# Patient Record
Sex: FEMALE | Race: WHITE | Employment: OTHER | URBAN - METROPOLITAN AREA
[De-identification: names, ages, dates, MRNs, and addresses within clinical notes are randomized per-mention and may not be internally consistent; named-entity substitution may affect disease eponyms.]

---

## 2018-03-20 PROBLEM — E66.811 OBESITY (BMI 30.0-34.9): Status: ACTIVE | Noted: 2018-03-20

## 2018-03-20 PROBLEM — E66.9 OBESITY (BMI 30.0-34.9): Status: ACTIVE | Noted: 2018-03-20

## 2018-03-20 PROBLEM — M17.11 PRIMARY OSTEOARTHRITIS OF RIGHT KNEE: Status: ACTIVE | Noted: 2018-03-20

## 2019-06-12 PROCEDURE — 81001 URINALYSIS AUTO W/SCOPE: CPT | Performed by: FAMILY MEDICINE

## 2020-02-06 ENCOUNTER — HOSPITAL ENCOUNTER (INPATIENT)
Facility: HOSPITAL | Age: 80
LOS: 1 days | Discharge: HOME | DRG: 066 | End: 2020-02-08
Attending: EMERGENCY MEDICINE | Admitting: HOSPITALIST
Payer: MEDICARE

## 2020-02-06 ENCOUNTER — APPOINTMENT (OUTPATIENT)
Dept: MRI IMAGING | Age: 80
DRG: 066 | End: 2020-02-06
Attending: EMERGENCY MEDICINE
Payer: MEDICARE

## 2020-02-06 ENCOUNTER — APPOINTMENT (OUTPATIENT)
Dept: CT IMAGING | Age: 80
DRG: 066 | End: 2020-02-06
Attending: EMERGENCY MEDICINE
Payer: MEDICARE

## 2020-02-06 ENCOUNTER — APPOINTMENT (OUTPATIENT)
Dept: GENERAL RADIOLOGY | Age: 80
DRG: 066 | End: 2020-02-06
Attending: EMERGENCY MEDICINE
Payer: MEDICARE

## 2020-02-06 DIAGNOSIS — I63.9 CEREBROVASCULAR ACCIDENT (CVA), UNSPECIFIED MECHANISM (HCC): Primary | ICD-10-CM

## 2020-02-06 LAB
ALBUMIN SERPL-MCNC: 3.6 G/DL (ref 3.4–5)
ALBUMIN/GLOB SERPL: 0.9 {RATIO} (ref 1–2)
ALP LIVER SERPL-CCNC: 119 U/L (ref 55–142)
ALT SERPL-CCNC: 65 U/L (ref 13–56)
ANION GAP SERPL CALC-SCNC: 9 MMOL/L (ref 0–18)
AST SERPL-CCNC: 91 U/L (ref 15–37)
BASOPHILS # BLD AUTO: 0.02 X10(3) UL (ref 0–0.2)
BASOPHILS NFR BLD AUTO: 0.2 %
BILIRUB SERPL-MCNC: 1 MG/DL (ref 0.1–2)
BUN BLD-MCNC: 14 MG/DL (ref 7–18)
BUN/CREAT SERPL: 21.2 (ref 10–20)
CALCIUM BLD-MCNC: 9.5 MG/DL (ref 8.5–10.1)
CHLORIDE SERPL-SCNC: 107 MMOL/L (ref 98–112)
CLARITY UR REFRACT.AUTO: CLEAR
CO2 SERPL-SCNC: 25 MMOL/L (ref 21–32)
COLOR UR AUTO: YELLOW
CREAT BLD-MCNC: 0.66 MG/DL (ref 0.55–1.02)
DEPRECATED RDW RBC AUTO: 45.1 FL (ref 35.1–46.3)
EOSINOPHIL # BLD AUTO: 0.01 X10(3) UL (ref 0–0.7)
EOSINOPHIL NFR BLD AUTO: 0.1 %
ERYTHROCYTE [DISTWIDTH] IN BLOOD BY AUTOMATED COUNT: 13 % (ref 11–15)
GLOBULIN PLAS-MCNC: 3.9 G/DL (ref 2.8–4.4)
GLUCOSE BLD-MCNC: 131 MG/DL (ref 70–99)
GLUCOSE UR STRIP.AUTO-MCNC: NEGATIVE MG/DL
HCT VFR BLD AUTO: 45.5 % (ref 35–48)
HGB BLD-MCNC: 14.5 G/DL (ref 12–16)
IMM GRANULOCYTES # BLD AUTO: 0.06 X10(3) UL (ref 0–1)
IMM GRANULOCYTES NFR BLD: 0.5 %
KETONES UR STRIP.AUTO-MCNC: 80 MG/DL
LEUKOCYTE ESTERASE UR QL STRIP.AUTO: NEGATIVE
LYMPHOCYTES # BLD AUTO: 0.94 X10(3) UL (ref 1–4)
LYMPHOCYTES NFR BLD AUTO: 7.7 %
M PROTEIN MFR SERPL ELPH: 7.5 G/DL (ref 6.4–8.2)
MCH RBC QN AUTO: 30.1 PG (ref 26–34)
MCHC RBC AUTO-ENTMCNC: 31.9 G/DL (ref 31–37)
MCV RBC AUTO: 94.4 FL (ref 80–100)
MONOCYTES # BLD AUTO: 0.88 X10(3) UL (ref 0.1–1)
MONOCYTES NFR BLD AUTO: 7.2 %
NEUTROPHILS # BLD AUTO: 10.29 X10 (3) UL (ref 1.5–7.7)
NEUTROPHILS # BLD AUTO: 10.29 X10(3) UL (ref 1.5–7.7)
NEUTROPHILS NFR BLD AUTO: 84.3 %
NITRITE UR QL STRIP.AUTO: NEGATIVE
OSMOLALITY SERPL CALC.SUM OF ELEC: 294 MOSM/KG (ref 275–295)
PH UR STRIP.AUTO: 5.5 [PH] (ref 4.5–8)
PLATELET # BLD AUTO: 198 10(3)UL (ref 150–450)
POTASSIUM SERPL-SCNC: 3.7 MMOL/L (ref 3.5–5.1)
RBC # BLD AUTO: 4.82 X10(6)UL (ref 3.8–5.3)
SODIUM SERPL-SCNC: 141 MMOL/L (ref 136–145)
SP GR UR STRIP.AUTO: >=1.03 (ref 1–1.03)
TROPONIN I SERPL-MCNC: <0.045 NG/ML (ref ?–0.04)
UROBILINOGEN UR STRIP.AUTO-MCNC: 0.2 MG/DL
WBC # BLD AUTO: 12.2 X10(3) UL (ref 4–11)

## 2020-02-06 PROCEDURE — 99285 EMERGENCY DEPT VISIT HI MDM: CPT

## 2020-02-06 PROCEDURE — 84484 ASSAY OF TROPONIN QUANT: CPT | Performed by: EMERGENCY MEDICINE

## 2020-02-06 PROCEDURE — 70553 MRI BRAIN STEM W/O & W/DYE: CPT | Performed by: EMERGENCY MEDICINE

## 2020-02-06 PROCEDURE — 93005 ELECTROCARDIOGRAM TRACING: CPT

## 2020-02-06 PROCEDURE — A9575 INJ GADOTERATE MEGLUMI 0.1ML: HCPCS | Performed by: EMERGENCY MEDICINE

## 2020-02-06 PROCEDURE — 85025 COMPLETE CBC W/AUTO DIFF WBC: CPT | Performed by: EMERGENCY MEDICINE

## 2020-02-06 PROCEDURE — 81001 URINALYSIS AUTO W/SCOPE: CPT | Performed by: EMERGENCY MEDICINE

## 2020-02-06 PROCEDURE — 71101 X-RAY EXAM UNILAT RIBS/CHEST: CPT | Performed by: EMERGENCY MEDICINE

## 2020-02-06 PROCEDURE — 36415 COLL VENOUS BLD VENIPUNCTURE: CPT

## 2020-02-06 PROCEDURE — 70450 CT HEAD/BRAIN W/O DYE: CPT | Performed by: EMERGENCY MEDICINE

## 2020-02-06 PROCEDURE — 80053 COMPREHEN METABOLIC PANEL: CPT | Performed by: EMERGENCY MEDICINE

## 2020-02-06 PROCEDURE — 93010 ELECTROCARDIOGRAM REPORT: CPT

## 2020-02-06 RX ORDER — ASPIRIN 325 MG
325 TABLET, DELAYED RELEASE (ENTERIC COATED) ORAL ONCE
Status: COMPLETED | OUTPATIENT
Start: 2020-02-06 | End: 2020-02-06

## 2020-02-07 ENCOUNTER — APPOINTMENT (OUTPATIENT)
Dept: MRI IMAGING | Facility: HOSPITAL | Age: 80
DRG: 066 | End: 2020-02-07
Attending: Other
Payer: MEDICARE

## 2020-02-07 ENCOUNTER — APPOINTMENT (OUTPATIENT)
Dept: CV DIAGNOSTICS | Facility: HOSPITAL | Age: 80
DRG: 066 | End: 2020-02-07
Attending: Other
Payer: MEDICARE

## 2020-02-07 ENCOUNTER — APPOINTMENT (OUTPATIENT)
Dept: CT IMAGING | Facility: HOSPITAL | Age: 80
DRG: 066 | End: 2020-02-07
Attending: Other
Payer: MEDICARE

## 2020-02-07 PROBLEM — I63.9 CEREBROVASCULAR ACCIDENT (CVA), UNSPECIFIED MECHANISM (HCC): Status: ACTIVE | Noted: 2020-02-07

## 2020-02-07 LAB
ANION GAP SERPL CALC-SCNC: 8 MMOL/L (ref 0–18)
ATRIAL RATE: 82 BPM
ATRIAL RATE: 84 BPM
BASOPHILS # BLD AUTO: 0.02 X10(3) UL (ref 0–0.2)
BASOPHILS NFR BLD AUTO: 0.2 %
BUN BLD-MCNC: 12 MG/DL (ref 7–18)
BUN/CREAT SERPL: 19.7 (ref 10–20)
CALCIUM BLD-MCNC: 9.2 MG/DL (ref 8.5–10.1)
CHLORIDE SERPL-SCNC: 107 MMOL/L (ref 98–112)
CHOLEST SMN-MCNC: 145 MG/DL (ref ?–200)
CO2 SERPL-SCNC: 27 MMOL/L (ref 21–32)
CREAT BLD-MCNC: 0.61 MG/DL (ref 0.55–1.02)
DEPRECATED RDW RBC AUTO: 45.3 FL (ref 35.1–46.3)
EOSINOPHIL # BLD AUTO: 0.02 X10(3) UL (ref 0–0.7)
EOSINOPHIL NFR BLD AUTO: 0.2 %
ERYTHROCYTE [DISTWIDTH] IN BLOOD BY AUTOMATED COUNT: 13 % (ref 11–15)
EST. AVERAGE GLUCOSE BLD GHB EST-MCNC: 100 MG/DL (ref 68–126)
GLUCOSE BLD-MCNC: 106 MG/DL (ref 70–99)
GLUCOSE BLD-MCNC: 109 MG/DL (ref 70–99)
GLUCOSE BLD-MCNC: 110 MG/DL (ref 70–99)
GLUCOSE BLD-MCNC: 88 MG/DL (ref 70–99)
GLUCOSE BLD-MCNC: 95 MG/DL (ref 70–99)
GLUCOSE BLD-MCNC: 98 MG/DL (ref 70–99)
HBA1C MFR BLD HPLC: 5.1 % (ref ?–5.7)
HCT VFR BLD AUTO: 43.5 % (ref 35–48)
HDLC SERPL-MCNC: 68 MG/DL (ref 40–59)
HGB BLD-MCNC: 14.1 G/DL (ref 12–16)
IMM GRANULOCYTES # BLD AUTO: 0.04 X10(3) UL (ref 0–1)
IMM GRANULOCYTES NFR BLD: 0.3 %
LDLC SERPL CALC-MCNC: 67 MG/DL (ref ?–100)
LYMPHOCYTES # BLD AUTO: 1.08 X10(3) UL (ref 1–4)
LYMPHOCYTES NFR BLD AUTO: 8.8 %
MCH RBC QN AUTO: 30.9 PG (ref 26–34)
MCHC RBC AUTO-ENTMCNC: 32.4 G/DL (ref 31–37)
MCV RBC AUTO: 95.4 FL (ref 80–100)
MONOCYTES # BLD AUTO: 1.14 X10(3) UL (ref 0.1–1)
MONOCYTES NFR BLD AUTO: 9.3 %
NEUTROPHILS # BLD AUTO: 9.99 X10 (3) UL (ref 1.5–7.7)
NEUTROPHILS # BLD AUTO: 9.99 X10(3) UL (ref 1.5–7.7)
NEUTROPHILS NFR BLD AUTO: 81.2 %
NONHDLC SERPL-MCNC: 77 MG/DL (ref ?–130)
OSMOLALITY SERPL CALC.SUM OF ELEC: 294 MOSM/KG (ref 275–295)
P AXIS: 46 DEGREES
P AXIS: 62 DEGREES
P-R INTERVAL: 144 MS
P-R INTERVAL: 144 MS
PLATELET # BLD AUTO: 187 10(3)UL (ref 150–450)
POTASSIUM SERPL-SCNC: 3.9 MMOL/L (ref 3.5–5.1)
Q-T INTERVAL: 344 MS
Q-T INTERVAL: 368 MS
QRS DURATION: 70 MS
QRS DURATION: 74 MS
QTC CALCULATION (BEZET): 418 MS
QTC CALCULATION (BEZET): 434 MS
R AXIS: 13 DEGREES
R AXIS: 5 DEGREES
RBC # BLD AUTO: 4.56 X10(6)UL (ref 3.8–5.3)
SODIUM SERPL-SCNC: 142 MMOL/L (ref 136–145)
T AXIS: -22 DEGREES
T AXIS: 0 DEGREES
TRIGL SERPL-MCNC: 50 MG/DL (ref 30–149)
VENTRICULAR RATE: 84 BPM
VENTRICULAR RATE: 89 BPM
VLDLC SERPL CALC-MCNC: 10 MG/DL (ref 0–30)
WBC # BLD AUTO: 12.3 X10(3) UL (ref 4–11)

## 2020-02-07 PROCEDURE — 93010 ELECTROCARDIOGRAM REPORT: CPT | Performed by: INTERNAL MEDICINE

## 2020-02-07 PROCEDURE — 82962 GLUCOSE BLOOD TEST: CPT

## 2020-02-07 PROCEDURE — 70549 MR ANGIOGRAPH NECK W/O&W/DYE: CPT | Performed by: OTHER

## 2020-02-07 PROCEDURE — 80061 LIPID PANEL: CPT | Performed by: HOSPITALIST

## 2020-02-07 PROCEDURE — S0028 INJECTION, FAMOTIDINE, 20 MG: HCPCS | Performed by: HOSPITALIST

## 2020-02-07 PROCEDURE — 83036 HEMOGLOBIN GLYCOSYLATED A1C: CPT | Performed by: HOSPITALIST

## 2020-02-07 PROCEDURE — 70450 CT HEAD/BRAIN W/O DYE: CPT | Performed by: OTHER

## 2020-02-07 PROCEDURE — 93306 TTE W/DOPPLER COMPLETE: CPT | Performed by: OTHER

## 2020-02-07 PROCEDURE — 70553 MRI BRAIN STEM W/O & W/DYE: CPT | Performed by: OTHER

## 2020-02-07 PROCEDURE — A9575 INJ GADOTERATE MEGLUMI 0.1ML: HCPCS

## 2020-02-07 PROCEDURE — 85025 COMPLETE CBC W/AUTO DIFF WBC: CPT | Performed by: HOSPITALIST

## 2020-02-07 PROCEDURE — 80048 BASIC METABOLIC PNL TOTAL CA: CPT | Performed by: HOSPITALIST

## 2020-02-07 PROCEDURE — 92610 EVALUATE SWALLOWING FUNCTION: CPT

## 2020-02-07 PROCEDURE — 70546 MR ANGIOGRAPH HEAD W/O&W/DYE: CPT | Performed by: OTHER

## 2020-02-07 PROCEDURE — 93005 ELECTROCARDIOGRAM TRACING: CPT

## 2020-02-07 RX ORDER — ACETAMINOPHEN 325 MG/1
650 TABLET ORAL EVERY 4 HOURS PRN
Status: DISCONTINUED | OUTPATIENT
Start: 2020-02-07 | End: 2020-02-08

## 2020-02-07 RX ORDER — ACETAMINOPHEN 650 MG/1
650 SUPPOSITORY RECTAL EVERY 4 HOURS PRN
Status: DISCONTINUED | OUTPATIENT
Start: 2020-02-07 | End: 2020-02-08

## 2020-02-07 RX ORDER — FAMOTIDINE 10 MG/ML
20 INJECTION, SOLUTION INTRAVENOUS DAILY
Status: DISCONTINUED | OUTPATIENT
Start: 2020-02-07 | End: 2020-02-08

## 2020-02-07 RX ORDER — METOPROLOL SUCCINATE 25 MG/1
25 TABLET, EXTENDED RELEASE ORAL
Status: DISCONTINUED | OUTPATIENT
Start: 2020-02-07 | End: 2020-02-07

## 2020-02-07 RX ORDER — ONDANSETRON 2 MG/ML
4 INJECTION INTRAMUSCULAR; INTRAVENOUS EVERY 6 HOURS PRN
Status: DISCONTINUED | OUTPATIENT
Start: 2020-02-07 | End: 2020-02-07

## 2020-02-07 RX ORDER — ONDANSETRON 2 MG/ML
4 INJECTION INTRAMUSCULAR; INTRAVENOUS EVERY 6 HOURS PRN
Status: DISCONTINUED | OUTPATIENT
Start: 2020-02-07 | End: 2020-02-08

## 2020-02-07 RX ORDER — FAMOTIDINE 20 MG/1
20 TABLET ORAL DAILY
Status: DISCONTINUED | OUTPATIENT
Start: 2020-02-07 | End: 2020-02-08

## 2020-02-07 RX ORDER — SODIUM PHOSPHATE, DIBASIC AND SODIUM PHOSPHATE, MONOBASIC 7; 19 G/133ML; G/133ML
1 ENEMA RECTAL ONCE AS NEEDED
Status: DISCONTINUED | OUTPATIENT
Start: 2020-02-07 | End: 2020-02-08

## 2020-02-07 RX ORDER — ACETAMINOPHEN 160 MG
2000 TABLET,DISINTEGRATING ORAL DAILY
Status: DISCONTINUED | OUTPATIENT
Start: 2020-02-07 | End: 2020-02-08

## 2020-02-07 RX ORDER — METOCLOPRAMIDE HYDROCHLORIDE 5 MG/ML
10 INJECTION INTRAMUSCULAR; INTRAVENOUS EVERY 8 HOURS PRN
Status: DISCONTINUED | OUTPATIENT
Start: 2020-02-07 | End: 2020-02-08

## 2020-02-07 RX ORDER — LEVOTHYROXINE SODIUM 0.15 MG/1
150 TABLET ORAL
Status: DISCONTINUED | OUTPATIENT
Start: 2020-02-07 | End: 2020-02-08

## 2020-02-07 RX ORDER — POLYETHYLENE GLYCOL 3350 17 G/17G
17 POWDER, FOR SOLUTION ORAL DAILY PRN
Status: DISCONTINUED | OUTPATIENT
Start: 2020-02-07 | End: 2020-02-08

## 2020-02-07 RX ORDER — SENNOSIDES 8.6 MG
17.2 TABLET ORAL NIGHTLY
Status: DISCONTINUED | OUTPATIENT
Start: 2020-02-07 | End: 2020-02-08

## 2020-02-07 RX ORDER — ASPIRIN 325 MG
325 TABLET ORAL DAILY
Status: DISCONTINUED | OUTPATIENT
Start: 2020-02-07 | End: 2020-02-08

## 2020-02-07 RX ORDER — SODIUM CHLORIDE 9 MG/ML
INJECTION, SOLUTION INTRAVENOUS CONTINUOUS
Status: DISCONTINUED | OUTPATIENT
Start: 2020-02-07 | End: 2020-02-08

## 2020-02-07 RX ORDER — BISACODYL 10 MG
10 SUPPOSITORY, RECTAL RECTAL
Status: DISCONTINUED | OUTPATIENT
Start: 2020-02-07 | End: 2020-02-08

## 2020-02-07 RX ORDER — ATORVASTATIN CALCIUM 80 MG/1
80 TABLET, FILM COATED ORAL NIGHTLY
Status: DISCONTINUED | OUTPATIENT
Start: 2020-02-07 | End: 2020-02-08

## 2020-02-07 RX ORDER — LABETALOL HYDROCHLORIDE 5 MG/ML
10 INJECTION, SOLUTION INTRAVENOUS EVERY 10 MIN PRN
Status: DISCONTINUED | OUTPATIENT
Start: 2020-02-07 | End: 2020-02-08

## 2020-02-07 RX ORDER — ASPIRIN 300 MG
300 SUPPOSITORY, RECTAL RECTAL DAILY
Status: DISCONTINUED | OUTPATIENT
Start: 2020-02-07 | End: 2020-02-08

## 2020-02-07 RX ORDER — DOCUSATE SODIUM 100 MG/1
100 CAPSULE, LIQUID FILLED ORAL 2 TIMES DAILY
Status: DISCONTINUED | OUTPATIENT
Start: 2020-02-07 | End: 2020-02-08

## 2020-02-07 NOTE — PHYSICAL THERAPY NOTE
Per stroke protocol and stroke committee recommendation, patient is on bedrest for 24 hrs from ADT time of 19:36 on 2/6/2020.   PT will evaluate the patient once activity level is upgraded

## 2020-02-07 NOTE — ED PROVIDER NOTES
Patient Seen in: [address] [facility] In [place]      History   Patient presents with:  Dizziness    Stated Complaint: dizziness fall last noc    HPI    50-year-old female presents for evaluation after a fall yesterday.   Patient states she was 80.3 kg   LMP  (LMP Unknown)   SpO2 96%   BMI 32.38 kg/m²         Physical Exam    General: Alert, oriented, no apparent distress  HEENT: Atraumatic, normocephalic. Pupils equal reactive. Extraocular motions intact. Oropharynx clear.     Neck: Supple  Moises [place] trauma. Radiologist recommend follow-up MRI    EKG shows no acute ischemic changes, labs including troponin are unremarkable.   No arrhythmias seen while observed on telemetry    Ct Brain Or Head (38739)    Result Date: 2/6/2020  PROCEDURE:  CT BRAIN OR HE Brain (w+wo) (cpt=70553)    Result Date: 2/6/2020  PROCEDURE:  MRI BRAIN (W+WO) (CPT=70553)  COMPARISON:  None.   INDICATIONS:  dizziness fall last noc  TECHNIQUE:  MRI of the brain was performed with multi-planar T1, T2-weighted images with FLAIR sequences lobe compressive atelectasis due to large hernia. CARDIAC:  Normal size cardiac silhouette. Retrocardiac large structure contains air and extends beyond the confines of the cardiac silhouette consistent with a large hernia which contains bowel loops.  MEDI

## 2020-02-07 NOTE — CONSULTS
[facility]  Consultation Note      David Brando Patient Status:  Inpatient    6/10/1940 MRN [id number]   [facility] 7NE-A Attending Chidi Agudelo, *   Hosp Day # 0 PCP Riley John MD     Outpatient cardiolog getting up from her recliner (thought to have tripped on her robe?), resultant forehead and R chest trauma/pain. Daughter thought her texts were confusing and she wasn't thinking properly, brought her to ER.   CNS imaging consistent with acute bilateral, m Diagnosis Date   • Essential hypertension    • Hyperlipidemia    • Hypothyroid        Past Surgical History:   Procedure Laterality Date   • APPENDECTOMY     •      • CHOLECYSTECTOMY     • KNEE ARTHROSCOPY Bilateral        Family History  family normoactive; no hepatosplenomegaly  Extremities: No clubbing or cyanosis; moves all 4 extremities normally  Psychiatric: Normal mood and affect; answers questions appropriately  Dermatologic: No rashes; normal skin turgor    Diagnostic testing:    Labs:

## 2020-02-07 NOTE — CONSULTS
Cristina Rome is a 67year old female. Patient presents with:  Dizziness    HPI:    neurologu consult for CVA    Pt fell at home . She was sitting and tried to stand and fell. She hit head and upper body.   No LOC  Pt denies dizziness but per chart , her d no heartburn  GENITAL/: no dysuria, urgency or frequency;  MUSCULOSKELETAL: no joint complaints upper or lower extremities  PSYCHE:no depression or anxiety  NEURO: denies   no incontinence,  Rest of systems reviewed and negative      PHYSICAL EXAM:   BP weighted images without and with infusion.      PATIENT STATED HISTORY:(As transcribed by Technologist)  Patient had a fall last night, dizziness for over a week      CONTRAST USED:  18 mL of Dotarem     FINDINGS:    INTRACRANIAL:  Areas of restricted diffu

## 2020-02-07 NOTE — PLAN OF CARE
Received patient from ED via cart  Family @ bedside  Patient alert oriented x4. Forgetful @ times  On telemetry monitoring. Denies SOB, Denies any pain.   NIH 4, left facial droop, left field cut, slight decrease sensation on left leg noted  Dr. Wing Marie strengthening/mobility  - Encourage toileting schedule  Outcome: Progressing     Problem: CARDIOVASCULAR - ADULT  Goal: Maintains optimal cardiac output and hemodynamic stability  Description  INTERVENTIONS:  - Monitor vital signs, rhythm, and trends  - Mo intact  Description  INTERVENTIONS  - Assess and document risk factors for pressure ulcer development  - Assess and document skin integrity  - Monitor for areas of redness and/or skin breakdown  - Initiate interventions, skin care algorithm/standards of ca Problem: Impaired Functional Mobility  Goal: Achieve highest/safest level of mobility/gait  Description  Interventions:  - Assess patient's functional ability and stability  - Promote increasing activity/tolerance for mobility and gait  - Educate and eng

## 2020-02-07 NOTE — OCCUPATIONAL THERAPY NOTE
Per stroke protocol and stroke committee recommendation, patient is on bedrest for 24 hrs from ADT time of 19:36 on 2/6/2020.   OT will evaluate the patient once activity level is upgraded

## 2020-02-07 NOTE — PLAN OF CARE
Assumed care at 0700. Pt A/O x4. RA. NSR/Afib on tele. VSS. Neuro checks q2 until 1000 and then q4. See flow sheets. Echo completed. Cardiology consulted for Afib. Script for xarelto and eliquis both sent to pharmacy to get a price.  SBP maintained within o assist with strengthening/mobility  - Encourage toileting schedule  Outcome: Progressing     Problem: CARDIOVASCULAR - ADULT  Goal: Maintains optimal cardiac output and hemodynamic stability  Description  INTERVENTIONS:  - Monitor vital signs, rhythm, and intact  Description  INTERVENTIONS  - Assess and document risk factors for pressure ulcer development  - Assess and document skin integrity  - Monitor for areas of redness and/or skin breakdown  - Initiate interventions, skin care algorithm/standards of ca small sips of liquid  - Offer pills one at a time, crush or deliver with applesauce as needed  - Discontinue feeding and notify MD (or speech pathologist) if coughing or persistent throat clearing or wet/gurgly vocal quality is noted   Outcome: Progressing

## 2020-02-07 NOTE — ED INITIAL ASSESSMENT (HPI)
Pt in er for evaluation after falling while trying to stand yesterday, c/o pain to her right ribs, a dizzy feeling, and nausea

## 2020-02-07 NOTE — PLAN OF CARE
Problem: Impaired Swallowing  Goal: Minimize aspiration risk  Description  Interventions:  - Patient should be alert and upright for all feedings (90 degrees preferred)  - Offer food and liquids at a slow rate  - Straws ok  - Encourage small bites of addison

## 2020-02-07 NOTE — SLP NOTE
ADULT SWALLOWING EVALUATION    ASSESSMENT    ASSESSMENT/OVERALL IMPRESSION:  Orders were received for a bedside swallowing evaluation per stroke protocol. Patient admitted following a fall at home. CT and MRI revealed an acute stroke.    Oral motor mechanis eat and drink    SWALLOWING HISTORY  Current Diet Consistency: NPO  Dysphagia History: No past history  Imaging Results:   MRI per radiology notes on 2/6/20:  CONCLUSION:    1.  Bilateral acute infarcts predominantly involve the right cerebral hemisphere an

## 2020-02-07 NOTE — H&P
[facility] Hospitalist H&P       CC: , fall    PCP: Miriam Farris MD    History of Present Illness: Pt is a 68 yo with mmp including but not limited to HTN/HL, hypothyroidism who is admitted for [facility], fall. Says bent over and hand slipped on chair.   Says facial droop   Head:  Normocephalic, without obvious abnormality, atraumatic. Eyes:  Sclera anicteric,  EOMs intact. Lids wnl.  PE   Ears, nose, throat:  external ears and nose within normal limits, hearing intact       Neck: Supple, symmetrical   Lungs: Rich Dao MD on 2/06/2020 at 20:49     Approved by: Rich Dao MD on 2/06/2020 at 20:52             ASSESSMENT / PLAN:    66 yo with mmp including but not limited to HTN/HL, hypothyroidism who is admitted for [address], fall    **LH, fall  **bilateral acute CVA

## 2020-02-08 VITALS
DIASTOLIC BLOOD PRESSURE: 66 MMHG | TEMPERATURE: 98 F | BODY MASS INDEX: 33 KG/M2 | HEART RATE: 97 BPM | SYSTOLIC BLOOD PRESSURE: 125 MMHG | OXYGEN SATURATION: 95 % | WEIGHT: 179.38 LBS | RESPIRATION RATE: 18 BRPM

## 2020-02-08 LAB
ALBUMIN SERPL-MCNC: 2.6 G/DL (ref 3.4–5)
ALBUMIN/GLOB SERPL: 0.8 {RATIO} (ref 1–2)
ALP LIVER SERPL-CCNC: 86 U/L (ref 55–142)
ALT SERPL-CCNC: 42 U/L (ref 13–56)
ANION GAP SERPL CALC-SCNC: 7 MMOL/L (ref 0–18)
AST SERPL-CCNC: 48 U/L (ref 15–37)
BILIRUB SERPL-MCNC: 0.8 MG/DL (ref 0.1–2)
BUN BLD-MCNC: 9 MG/DL (ref 7–18)
BUN/CREAT SERPL: 17.6 (ref 10–20)
CALCIUM BLD-MCNC: 8.1 MG/DL (ref 8.5–10.1)
CHLORIDE SERPL-SCNC: 111 MMOL/L (ref 98–112)
CO2 SERPL-SCNC: 24 MMOL/L (ref 21–32)
CREAT BLD-MCNC: 0.51 MG/DL (ref 0.55–1.02)
DEPRECATED RDW RBC AUTO: 46 FL (ref 35.1–46.3)
ERYTHROCYTE [DISTWIDTH] IN BLOOD BY AUTOMATED COUNT: 13.1 % (ref 11–15)
GLOBULIN PLAS-MCNC: 3.4 G/DL (ref 2.8–4.4)
GLUCOSE BLD-MCNC: 78 MG/DL (ref 70–99)
GLUCOSE BLD-MCNC: 88 MG/DL (ref 70–99)
HAV IGM SER QL: 1.8 MG/DL (ref 1.6–2.6)
HCT VFR BLD AUTO: 41.3 % (ref 35–48)
HGB BLD-MCNC: 13 G/DL (ref 12–16)
M PROTEIN MFR SERPL ELPH: 6 G/DL (ref 6.4–8.2)
MCH RBC QN AUTO: 30.1 PG (ref 26–34)
MCHC RBC AUTO-ENTMCNC: 31.5 G/DL (ref 31–37)
MCV RBC AUTO: 95.6 FL (ref 80–100)
OSMOLALITY SERPL CALC.SUM OF ELEC: 292 MOSM/KG (ref 275–295)
PLATELET # BLD AUTO: 181 10(3)UL (ref 150–450)
POTASSIUM SERPL-SCNC: 3.6 MMOL/L (ref 3.5–5.1)
RBC # BLD AUTO: 4.32 X10(6)UL (ref 3.8–5.3)
SODIUM SERPL-SCNC: 142 MMOL/L (ref 136–145)
TSI SER-ACNC: 3.17 MIU/ML (ref 0.36–3.74)
WBC # BLD AUTO: 8.5 X10(3) UL (ref 4–11)

## 2020-02-08 PROCEDURE — 82962 GLUCOSE BLOOD TEST: CPT

## 2020-02-08 PROCEDURE — 85027 COMPLETE CBC AUTOMATED: CPT | Performed by: HOSPITALIST

## 2020-02-08 PROCEDURE — 97162 PT EVAL MOD COMPLEX 30 MIN: CPT

## 2020-02-08 PROCEDURE — 84443 ASSAY THYROID STIM HORMONE: CPT | Performed by: NURSE PRACTITIONER

## 2020-02-08 PROCEDURE — 83735 ASSAY OF MAGNESIUM: CPT | Performed by: HOSPITALIST

## 2020-02-08 PROCEDURE — 80053 COMPREHEN METABOLIC PANEL: CPT | Performed by: HOSPITALIST

## 2020-02-08 PROCEDURE — 97116 GAIT TRAINING THERAPY: CPT

## 2020-02-08 RX ORDER — MAGNESIUM OXIDE 400 MG (241.3 MG MAGNESIUM) TABLET
400 TABLET ONCE
Status: COMPLETED | OUTPATIENT
Start: 2020-02-08 | End: 2020-02-08

## 2020-02-08 RX ORDER — ASPIRIN 81 MG/1
81 TABLET ORAL DAILY
Qty: 30 TABLET | Refills: 0 | Status: SHIPPED | OUTPATIENT
Start: 2020-02-09 | End: 2020-08-28 | Stop reason: CLARIF

## 2020-02-08 RX ORDER — POTASSIUM CHLORIDE 20 MEQ/1
40 TABLET, EXTENDED RELEASE ORAL EVERY 4 HOURS
Status: DISCONTINUED | OUTPATIENT
Start: 2020-02-08 | End: 2020-02-08

## 2020-02-08 NOTE — PROGRESS NOTES
Pt fell at home . She was sitting and tried to stand and fell. She hit head and upper body.   No LOC  Pt denies dizziness but per chart , her dtr says she had an episode of dizziness on Monday  Pt denies cp, palp, SHEPHERD  No recent f/c/illness  No previous epi stroke      CONTRAST USED:  17 mL of Dotarem     FINDINGS:    MRI BRAIN  Scattered areas of restricted diffusion primarily centered on right temporal lobe, right frontal lobe, left frontal lobe, and left occipital region are overall stable from the prior e Procedure Laterality Date   • Appendectomy       •        • Cholecystectomy       • Knee arthroscopy Bilateral              Codeine                            Family History   Problem Relation Age of Onset   • Stroke Mother     • Heart Disorder VF     Fundi: No pepilledema     Pupils: ERRLA     EOM/lids NL     Facial sensation/Corneal: NL     Face (motor) dec left nlf     Hearing: NL     Palate: NL     SCM/Trapezius: 5/5     Tongue in the middle     MOTOR FUNCTION:     Tone: NL     Bulk: NL     S

## 2020-02-08 NOTE — PROGRESS NOTES
[address] [facility]  Progress Note    Sigrid Lopez Patient Status:  Inpatient    6/10/1940 MRN [id number]   [facility] 7NE-A Attending Juanjose Lehman, *   Hosp Day # 1 PCP Vince Han MD     Outpatient Cardiologist: n Labs   Lab 02/07/20  0603 02/07/20  1259 02/07/20  1652 02/07/20  2111 02/08/20  0803   PGLU 110* [phone]       Tele: NSR with paroxysmal atrial fibrillation.      Imaging:     Echo 2/7/2020  ECG rhythm:   Atrial fibrillation  -------------------------- not cost-prohibitive for her. - begin Xarelto 20mg daily when ok with neurology. Pulmonary HTN  - noted on echo 2/7/2020 with PASP 60-65mmHg  - reports loud snoring. No SHEPHERD. - pulmonary referral as outpatient with sleep study. Suspected LIZ.      HTN

## 2020-02-08 NOTE — PLAN OF CARE
Pt discharged per wc per transport. Neuro education completed but pt reminded to read stroke binder and how important action is when it comes to s/s. No c/o and in no apparent distress. avs reviewed with pt and daughter.

## 2020-02-08 NOTE — PLAN OF CARE
Assumed pt care @ 1930, pt was sleeping in bed, opened eyes spontaneously, family visiting at bedside. Neuro q 4. No focal deficit noted overnight.  Vision field cut was difficult to assess as pt kept looking to the sides when testing peripheral vision physical needs  - Identify cognitive and physical deficits and behaviors that affect risk of falls.   - [place] fall precautions as indicated by assessment.  - Educate pt/family on patient safety including physical limitations  - Instruct pt to call for a broncho-pulmonary hygiene including cough, deep breathe, Incentive Spirometry  - Assess the need for suctioning and perform as needed  - Assess and instruct to report SOB or any respiratory difficulty  - Respiratory Therapy support as indicated  - Manage/a and ambulation  Outcome: Progressing     Problem: Impaired Cognition  Goal: Patient will exhibit improved attention, thought processing and/or memory  Description  Interventions:  - Minimize distractions in the room when full attention is required  Outcome

## 2020-02-08 NOTE — PHYSICAL THERAPY NOTE
PHYSICAL THERAPY QUICK EVALUATION - INPATIENT    Room Number: 3040/8966-C  Evaluation Date: 2/8/2020  Presenting Problem: ACute infarctR  cerebral hemisphere and R cerebellum likely embolic  Physician Order: PT Eval and Treat    Problem List  Principal P risk    WEIGHT BEARING RESTRICTION  Weight Bearing Restriction: None                PAIN ASSESSMENT  Ratin         RANGE OF MOTION AND STRENGTH ASSESSMENT  Upper extremity ROM and strength are within functional limits     Lower extremity ROM is within nose. Pertinent comorbidities and personal factors impacting therapy include: hx of old CVA and B knee replacements . Pt is now asked for PT eval prior to home d/c and at this time she is observed to be within her baseline functional skills and does not nee

## 2020-02-10 LAB
ATRIAL RATE: 122 BPM
Q-T INTERVAL: 354 MS
QRS DURATION: 66 MS
QTC CALCULATION (BEZET): 504 MS
R AXIS: 11 DEGREES
T AXIS: -52 DEGREES
VENTRICULAR RATE: 122 BPM

## 2020-03-10 PROBLEM — I48.0 PAROXYSMAL ATRIAL FIBRILLATION (HCC): Status: ACTIVE | Noted: 2020-03-10

## 2020-03-10 PROBLEM — I27.20 PULMONARY HTN (HCC): Status: ACTIVE | Noted: 2020-03-10

## 2020-08-28 ENCOUNTER — APPOINTMENT (OUTPATIENT)
Dept: GENERAL RADIOLOGY | Facility: HOSPITAL | Age: 80
DRG: 308 | End: 2020-08-28
Attending: EMERGENCY MEDICINE
Payer: MEDICARE

## 2020-08-28 ENCOUNTER — HOSPITAL ENCOUNTER (INPATIENT)
Facility: HOSPITAL | Age: 80
LOS: 3 days | Discharge: HOME | DRG: 308 | End: 2020-08-31
Attending: EMERGENCY MEDICINE | Admitting: HOSPITALIST
Payer: MEDICARE

## 2020-08-28 DIAGNOSIS — I48.91 ATRIAL FIBRILLATION WITH RAPID VENTRICULAR RESPONSE (HCC): Primary | ICD-10-CM

## 2020-08-28 DIAGNOSIS — I50.9 ACUTE ON CHRONIC CONGESTIVE HEART FAILURE, UNSPECIFIED HEART FAILURE TYPE (HCC): ICD-10-CM

## 2020-08-28 LAB
ALBUMIN SERPL-MCNC: 3.5 G/DL (ref 3.4–5)
ALBUMIN/GLOB SERPL: 0.9 {RATIO} (ref 1–2)
ALP LIVER SERPL-CCNC: 165 U/L (ref 55–142)
ALT SERPL-CCNC: 55 U/L (ref 13–56)
ANION GAP SERPL CALC-SCNC: 5 MMOL/L (ref 0–18)
AST SERPL-CCNC: 69 U/L (ref 15–37)
ATRIAL RATE: 187 BPM
BASOPHILS # BLD AUTO: 0.05 X10(3) UL (ref 0–0.2)
BASOPHILS NFR BLD AUTO: 0.6 %
BILIRUB SERPL-MCNC: 1.5 MG/DL (ref 0.1–2)
BUN BLD-MCNC: 19 MG/DL (ref 7–18)
BUN/CREAT SERPL: 18.1 (ref 10–20)
CALCIUM BLD-MCNC: 9 MG/DL (ref 8.5–10.1)
CHLORIDE SERPL-SCNC: 111 MMOL/L (ref 98–112)
CO2 SERPL-SCNC: 25 MMOL/L (ref 21–32)
CREAT BLD-MCNC: 1.05 MG/DL (ref 0.55–1.02)
DEPRECATED RDW RBC AUTO: 51.5 FL (ref 35.1–46.3)
EOSINOPHIL # BLD AUTO: 0.08 X10(3) UL (ref 0–0.7)
EOSINOPHIL NFR BLD AUTO: 1 %
ERYTHROCYTE [DISTWIDTH] IN BLOOD BY AUTOMATED COUNT: 15.2 % (ref 11–15)
GLOBULIN PLAS-MCNC: 3.8 G/DL (ref 2.8–4.4)
GLUCOSE BLD-MCNC: 122 MG/DL (ref 70–99)
HCT VFR BLD AUTO: 54 % (ref 35–48)
HGB BLD-MCNC: 16.6 G/DL (ref 12–16)
IMM GRANULOCYTES # BLD AUTO: 0.02 X10(3) UL (ref 0–1)
IMM GRANULOCYTES NFR BLD: 0.3 %
LYMPHOCYTES # BLD AUTO: 1.8 X10(3) UL (ref 1–4)
LYMPHOCYTES NFR BLD AUTO: 22.8 %
M PROTEIN MFR SERPL ELPH: 7.3 G/DL (ref 6.4–8.2)
MCH RBC QN AUTO: 28.9 PG (ref 26–34)
MCHC RBC AUTO-ENTMCNC: 30.7 G/DL (ref 31–37)
MCV RBC AUTO: 94.1 FL (ref 80–100)
MONOCYTES # BLD AUTO: 0.71 X10(3) UL (ref 0.1–1)
MONOCYTES NFR BLD AUTO: 9 %
NEUTROPHILS # BLD AUTO: 5.23 X10 (3) UL (ref 1.5–7.7)
NEUTROPHILS # BLD AUTO: 5.23 X10(3) UL (ref 1.5–7.7)
NEUTROPHILS NFR BLD AUTO: 66.3 %
NT-PROBNP SERPL-MCNC: 4334 PG/ML (ref ?–450)
OSMOLALITY SERPL CALC.SUM OF ELEC: 296 MOSM/KG (ref 275–295)
PLATELET # BLD AUTO: 227 10(3)UL (ref 150–450)
POTASSIUM SERPL-SCNC: 3.9 MMOL/L (ref 3.5–5.1)
Q-T INTERVAL: 258 MS
QRS DURATION: 68 MS
QTC CALCULATION (BEZET): 430 MS
R AXIS: 16 DEGREES
RBC # BLD AUTO: 5.74 X10(6)UL (ref 3.8–5.3)
SARS-COV-2 RNA RESP QL NAA+PROBE: NOT DETECTED
SODIUM SERPL-SCNC: 141 MMOL/L (ref 136–145)
T AXIS: 260 DEGREES
TROPONIN I SERPL-MCNC: <0.045 NG/ML (ref ?–0.04)
VENTRICULAR RATE: 167 BPM
WBC # BLD AUTO: 7.9 X10(3) UL (ref 4–11)

## 2020-08-28 PROCEDURE — 96366 THER/PROPH/DIAG IV INF ADDON: CPT

## 2020-08-28 PROCEDURE — 93010 ELECTROCARDIOGRAM REPORT: CPT

## 2020-08-28 PROCEDURE — 85025 COMPLETE CBC W/AUTO DIFF WBC: CPT | Performed by: EMERGENCY MEDICINE

## 2020-08-28 PROCEDURE — 96365 THER/PROPH/DIAG IV INF INIT: CPT

## 2020-08-28 PROCEDURE — 99285 EMERGENCY DEPT VISIT HI MDM: CPT

## 2020-08-28 PROCEDURE — 80053 COMPREHEN METABOLIC PANEL: CPT | Performed by: EMERGENCY MEDICINE

## 2020-08-28 PROCEDURE — 96375 TX/PRO/DX INJ NEW DRUG ADDON: CPT

## 2020-08-28 PROCEDURE — 83880 ASSAY OF NATRIURETIC PEPTIDE: CPT | Performed by: EMERGENCY MEDICINE

## 2020-08-28 PROCEDURE — 93005 ELECTROCARDIOGRAM TRACING: CPT

## 2020-08-28 PROCEDURE — 71045 X-RAY EXAM CHEST 1 VIEW: CPT | Performed by: EMERGENCY MEDICINE

## 2020-08-28 PROCEDURE — 84484 ASSAY OF TROPONIN QUANT: CPT | Performed by: EMERGENCY MEDICINE

## 2020-08-28 RX ORDER — ACETAMINOPHEN 325 MG/1
650 TABLET ORAL EVERY 6 HOURS PRN
Status: DISCONTINUED | OUTPATIENT
Start: 2020-08-28 | End: 2020-08-31

## 2020-08-28 RX ORDER — LEVOTHYROXINE SODIUM 0.15 MG/1
150 TABLET ORAL
Status: DISCONTINUED | OUTPATIENT
Start: 2020-08-29 | End: 2020-08-31

## 2020-08-28 RX ORDER — MONTELUKAST SODIUM 10 MG/1
10 TABLET ORAL NIGHTLY
COMMUNITY
End: 2020-08-28 | Stop reason: CLARIF

## 2020-08-28 RX ORDER — FUROSEMIDE 10 MG/ML
40 INJECTION INTRAMUSCULAR; INTRAVENOUS ONCE
Status: COMPLETED | OUTPATIENT
Start: 2020-08-28 | End: 2020-08-28

## 2020-08-28 RX ORDER — SODIUM PHOSPHATE, DIBASIC AND SODIUM PHOSPHATE, MONOBASIC 7; 19 G/133ML; G/133ML
1 ENEMA RECTAL ONCE AS NEEDED
Status: DISCONTINUED | OUTPATIENT
Start: 2020-08-28 | End: 2020-08-31

## 2020-08-28 RX ORDER — METOCLOPRAMIDE HYDROCHLORIDE 5 MG/ML
5 INJECTION INTRAMUSCULAR; INTRAVENOUS EVERY 8 HOURS PRN
Status: DISCONTINUED | OUTPATIENT
Start: 2020-08-28 | End: 2020-08-31

## 2020-08-28 RX ORDER — POLYETHYLENE GLYCOL 3350 17 G/17G
17 POWDER, FOR SOLUTION ORAL DAILY PRN
Status: DISCONTINUED | OUTPATIENT
Start: 2020-08-28 | End: 2020-08-31

## 2020-08-28 RX ORDER — METOPROLOL SUCCINATE 50 MG/1
50 TABLET, EXTENDED RELEASE ORAL DAILY
COMMUNITY
End: 2020-08-28 | Stop reason: CLARIF

## 2020-08-28 RX ORDER — BISACODYL 10 MG
10 SUPPOSITORY, RECTAL RECTAL
Status: DISCONTINUED | OUTPATIENT
Start: 2020-08-28 | End: 2020-08-31

## 2020-08-28 RX ORDER — ATORVASTATIN CALCIUM 10 MG/1
10 TABLET, FILM COATED ORAL NIGHTLY
Status: DISCONTINUED | OUTPATIENT
Start: 2020-08-28 | End: 2020-08-31

## 2020-08-28 RX ORDER — ONDANSETRON 2 MG/ML
4 INJECTION INTRAMUSCULAR; INTRAVENOUS EVERY 6 HOURS PRN
Status: DISCONTINUED | OUTPATIENT
Start: 2020-08-28 | End: 2020-08-31

## 2020-08-28 NOTE — PROGRESS NOTES
[facility] Note:  Renal Dose Adjustment for Metoclopramide (REGLAN)    Sandra Mistry has been prescribed Metoclopramide (REGLAN) 10 mg IV every 8 hours as needed for nausea/vomiting.     Estimated Creatinine Clearance: 34.4 mL/min (A) (based on SCr of 1.05

## 2020-08-28 NOTE — ED PROVIDER NOTES
Patient Seen in: [facility] Emergency Department      History   Patient presents with:  Arrythmia/Palpitations    Stated Complaint: palpitations    HPI    80-year-old female with history of paroxysmal atrial fibrillation presents for evaluation of e (5' 2\")   Wt 79.8 kg   LMP  (LMP Unknown)   SpO2 96%   BMI 32.18 kg/m²         Physical Exam    General: Alert, oriented, no apparent distress  HEENT: Atraumatic, normocephalic. Pupils equal reactive. Extraocular motions intact. Oropharynx clear.     Nec SARS-COV-2 BY PCR     EKG    Rate, intervals and axes as noted on EKG Report.   Rate: 167  Rhythm: Atrial Fibrillation  Reading: no ST elevation or depression                   MDM     Xr Chest Ap Portable  (cpt=71045)    Result Date: 8/28/2020  PROCEDURE:

## 2020-08-28 NOTE — ED INITIAL ASSESSMENT (HPI)
Palpitations after her stroke in february. Went for outpatient echo and cardiologist sent her to the ER.

## 2020-08-28 NOTE — H&P
[facility] Hospitalist H&P       CC: sob    PCP: Lb Hill MD    History of Present Illness: Pt is a 78 yo with mmp including but not limited to HTN/HL, pAfib, hypothyroidism, cerebrovascular dz with hx CVA who is admitted for sob.  Was at cardiology o effort   Chest wall:  No tenderness or deformity. Heart:  Regular rate and rhythm, S1, S2 normal,+1 LE edema   Abdomen:   Soft, non-tender. Bowel sounds normal. . Non distended, no overt hernias   Extremities: Extremities normal, atraumatic, +1  edema. Further recommendations pending patient's clinical course.   [facility] hospitalist to continue to follow patient while in house    Patient and/or patient's family given opportunity to ask questions and note understanding and agreeing with therapeutic plan as

## 2020-08-28 NOTE — PLAN OF CARE
Admission Note    Patient brought to room. Oriented to room, shown call light use. Orthostatic BP taken. Vital signs stable. Home medications reviewed with patient. Patient updated on current plan of care. Plan is cardizem drip and cardiology to see.  Sadiq

## 2020-08-29 LAB
ALBUMIN SERPL-MCNC: 3 G/DL (ref 3.4–5)
ALBUMIN/GLOB SERPL: 1.1 {RATIO} (ref 1–2)
ALP LIVER SERPL-CCNC: 145 U/L (ref 55–142)
ALT SERPL-CCNC: 42 U/L (ref 13–56)
ANION GAP SERPL CALC-SCNC: 6 MMOL/L (ref 0–18)
AST SERPL-CCNC: 46 U/L (ref 15–37)
BASOPHILS # BLD AUTO: 0.07 X10(3) UL (ref 0–0.2)
BASOPHILS NFR BLD AUTO: 1.1 %
BILIRUB SERPL-MCNC: 1.2 MG/DL (ref 0.1–2)
BUN BLD-MCNC: 16 MG/DL (ref 7–18)
BUN/CREAT SERPL: 17.8 (ref 10–20)
CALCIUM BLD-MCNC: 9.2 MG/DL (ref 8.5–10.1)
CHLORIDE SERPL-SCNC: 109 MMOL/L (ref 98–112)
CO2 SERPL-SCNC: 29 MMOL/L (ref 21–32)
CREAT BLD-MCNC: 0.9 MG/DL (ref 0.55–1.02)
DEPRECATED RDW RBC AUTO: 51.6 FL (ref 35.1–46.3)
EOSINOPHIL # BLD AUTO: 0.14 X10(3) UL (ref 0–0.7)
EOSINOPHIL NFR BLD AUTO: 2.3 %
ERYTHROCYTE [DISTWIDTH] IN BLOOD BY AUTOMATED COUNT: 14.9 % (ref 11–15)
GLOBULIN PLAS-MCNC: 2.8 G/DL (ref 2.8–4.4)
GLUCOSE BLD-MCNC: 85 MG/DL (ref 70–99)
HAV IGM SER QL: 2.1 MG/DL (ref 1.6–2.6)
HCT VFR BLD AUTO: 45.2 % (ref 35–48)
HGB BLD-MCNC: 13.9 G/DL (ref 12–16)
IMM GRANULOCYTES # BLD AUTO: 0.02 X10(3) UL (ref 0–1)
IMM GRANULOCYTES NFR BLD: 0.3 %
LYMPHOCYTES # BLD AUTO: 1.63 X10(3) UL (ref 1–4)
LYMPHOCYTES NFR BLD AUTO: 26.7 %
M PROTEIN MFR SERPL ELPH: 5.8 G/DL (ref 6.4–8.2)
MCH RBC QN AUTO: 29 PG (ref 26–34)
MCHC RBC AUTO-ENTMCNC: 30.8 G/DL (ref 31–37)
MCV RBC AUTO: 94.4 FL (ref 80–100)
MONOCYTES # BLD AUTO: 0.9 X10(3) UL (ref 0.1–1)
MONOCYTES NFR BLD AUTO: 14.8 %
NEUTROPHILS # BLD AUTO: 3.34 X10 (3) UL (ref 1.5–7.7)
NEUTROPHILS # BLD AUTO: 3.34 X10(3) UL (ref 1.5–7.7)
NEUTROPHILS NFR BLD AUTO: 54.8 %
OSMOLALITY SERPL CALC.SUM OF ELEC: 298 MOSM/KG (ref 275–295)
PLATELET # BLD AUTO: 187 10(3)UL (ref 150–450)
POTASSIUM SERPL-SCNC: 3.4 MMOL/L (ref 3.5–5.1)
RBC # BLD AUTO: 4.79 X10(6)UL (ref 3.8–5.3)
SODIUM SERPL-SCNC: 144 MMOL/L (ref 136–145)
WBC # BLD AUTO: 6.1 X10(3) UL (ref 4–11)

## 2020-08-29 PROCEDURE — 83735 ASSAY OF MAGNESIUM: CPT | Performed by: HOSPITALIST

## 2020-08-29 PROCEDURE — 85025 COMPLETE CBC W/AUTO DIFF WBC: CPT | Performed by: HOSPITALIST

## 2020-08-29 PROCEDURE — 80053 COMPREHEN METABOLIC PANEL: CPT | Performed by: HOSPITALIST

## 2020-08-29 RX ORDER — POTASSIUM CHLORIDE 20 MEQ/1
40 TABLET, EXTENDED RELEASE ORAL EVERY 4 HOURS
Status: COMPLETED | OUTPATIENT
Start: 2020-08-29 | End: 2020-08-29

## 2020-08-29 NOTE — PROGRESS NOTES
[facility] Cardiology Progress Note - Esthela Gomez Patient Status:  Inpatient    6/10/1940 MRN [id number]   [facility] 8NE-A Attending Magnolia L[place], [place] [facility] Day # 1 PCP Denis Sandoval MD     Subjective:  Deon Cope constitutional distress. HEENT: Normocephalic, anicteric sclera, neck supple, no thyromegaly or adenopathy. Neck: No JVD, carotids 2+, no bruits. Cardiac: Regular rate and rhythm.  S1, S2 normal. No murmur, pericardial rub, S3, thrill, heave or extra car 15 mg, Oral, Daily with food        ROS:  General Health: otherwise feels well, weight stable  Constitutiona: no recent fevers  Skin: denies any unusual skin lesions or rashes  Eyes: no visual complaints or deficits  HEENT: denies nasal congestion, sinus p

## 2020-08-29 NOTE — PLAN OF CARE
Assumed care for pt at 20:30 on 8/28    A&Ox4  Denies CP or SOB  VSS on RA, spO2 95%  Afib on tele, rate controlled with cardizem gtt @ 15mg/ml/hr  500 on IS best effort.  [facility]  Continent of B&B  Up with cane/standby assist  CHF protocol, 1800 FR, I&O

## 2020-08-29 NOTE — PROGRESS NOTES
[facility] Hospitalist Progress Note     PCP: Jada Winkler MD    CC:  Follow up    SUBJECTIVE:  Down 3 lbs overnight. Breathing better.  Daughter at bedside    OBJECTIVE:  Temp:  [97.4 °F (36.3 °C)-97.7 °F (36.5 °C)] 97.6 °F (36.4 °C)  Pulse:  [] 107  Resp [facility])  Active Problems:    Acute on chronic congestive heart failure, unspecified heart failure type ([facility] [address]     80 yo with mmp including but not limited to HTN/HL, pAfib, hypothyroidism, cerebrovascular dz with hx CVA who is admitted for sob.     **sob  **a

## 2020-08-29 NOTE — PLAN OF CARE
Pt is A&O X4, not in apparent distress on RA, A-fib per tele on cardizem drip, denies chest pain or palpitation. Poc updated, cont xarelto, add beta blocker, poss cardioversion on Monday.  Cpm.  Problem: CARDIOVASCULAR - ADULT  Goal: Maintains optimal cardi

## 2020-08-30 LAB
ALBUMIN SERPL-MCNC: 3.2 G/DL (ref 3.4–5)
ALBUMIN/GLOB SERPL: 1.1 {RATIO} (ref 1–2)
ALP LIVER SERPL-CCNC: 143 U/L (ref 55–142)
ALT SERPL-CCNC: 40 U/L (ref 13–56)
ANION GAP SERPL CALC-SCNC: 3 MMOL/L (ref 0–18)
AST SERPL-CCNC: 44 U/L (ref 15–37)
BILIRUB SERPL-MCNC: 1.1 MG/DL (ref 0.1–2)
BUN BLD-MCNC: 19 MG/DL (ref 7–18)
BUN/CREAT SERPL: 23.5 (ref 10–20)
CALCIUM BLD-MCNC: 9.1 MG/DL (ref 8.5–10.1)
CHLORIDE SERPL-SCNC: 110 MMOL/L (ref 98–112)
CO2 SERPL-SCNC: 28 MMOL/L (ref 21–32)
CREAT BLD-MCNC: 0.81 MG/DL (ref 0.55–1.02)
GLOBULIN PLAS-MCNC: 3 G/DL (ref 2.8–4.4)
GLUCOSE BLD-MCNC: 104 MG/DL (ref 70–99)
HAV IGM SER QL: 2.1 MG/DL (ref 1.6–2.6)
M PROTEIN MFR SERPL ELPH: 6.2 G/DL (ref 6.4–8.2)
OSMOLALITY SERPL CALC.SUM OF ELEC: 295 MOSM/KG (ref 275–295)
POTASSIUM SERPL-SCNC: 4.5 MMOL/L (ref 3.5–5.1)
SODIUM SERPL-SCNC: 141 MMOL/L (ref 136–145)

## 2020-08-30 PROCEDURE — 80053 COMPREHEN METABOLIC PANEL: CPT | Performed by: HOSPITALIST

## 2020-08-30 PROCEDURE — 83735 ASSAY OF MAGNESIUM: CPT | Performed by: HOSPITALIST

## 2020-08-30 RX ORDER — SODIUM CHLORIDE 9 MG/ML
INJECTION, SOLUTION INTRAVENOUS
Status: ACTIVE | OUTPATIENT
Start: 2020-08-31 | End: 2020-08-31

## 2020-08-30 NOTE — PLAN OF CARE
Assumed care for pt at 19:30 on 8/29    A&Ox4  Denies pain or SOB  VSS on RA, spO2 96%  Afib, rates controlled with cardizem @ 10 mg/ml/hr and on a metoprolol.  On Xarelto  No edema to BLE. 750 on IS best effort  Continent of B&B  Up with cane and standby

## 2020-08-30 NOTE — PROGRESS NOTES
[facility] Cardiology Progress Note - Pauliekelsey Tijerina Patient Status:  Inpatient    6/10/1940 MRN [id number]   [facility] 8NE-A Attending Marthenia Goodell, MD   Hosp Day # 2 PCP Jada Winkler MD     Subjective:  Guero Kerns distress. HEENT: Normocephalic, anicteric sclera, neck supple, no thyromegaly or adenopathy. Neck: No JVD, carotids 2+, no bruits. Cardiac: Regular rate and rhythm. S1, S2 normal. No murmur, pericardial rub, S3, thrill, heave or extra cardiac sounds.   L injection 4 mg, 4 mg, Intravenous, Q6H PRN  Metoclopramide HCl (REGLAN) injection 5 mg, 5 mg, Intravenous, Q8H PRN  rivaroxaban (XARELTO) tab 15 mg, 15 mg, Oral, Daily with food        ROS:  General Health: otherwise feels well, weight stable  Constitution

## 2020-08-30 NOTE — PROGRESS NOTES
[facility] Hospitalist Progress Note     PCP: Bill Troncoso MD    CC:  Follow up    SUBJECTIVE:    No acute events. HRs under much better control.       OBJECTIVE:  Temp:  [97.1 °F (36.2 °C)-97.8 °F (36.6 °C)] 97.1 °F (36.2 °C)  Pulse:  [] 80  Resp:  [ hydroxide, bisacodyl, Fleet Enema, ondansetron HCl, Metoclopramide HCl       Assessment/Plan:     Principal Problem:    Atrial fibrillation with rapid ventricular response (HCC)  Active Problems:    Acute on chronic congestive heart failure, unspecified he

## 2020-08-30 NOTE — PLAN OF CARE
8/30/2020       A&Ox4  Denies pain or SOB  VSS on RA, spO2 96%  Afib, rates controlled with cardizem @ 10 mg/ml/hr and on a metoprolol.  On Xarelto  No edema to BLE. 750 on IS best effort  Continent of B&B  Up with cane and standby    CHF protocol    Pt amb

## 2020-08-30 NOTE — PLAN OF CARE
Assumed care of pt at 1300. Denies any pain or SOB. Afib, HR controlled. Cardizem gtt running at 10 mg/hr. Plan for DCCV tomorrow per cardiology. Consent signed and placed on pt's chart. Will continue to monitor.     Problem: CARDIOVASCULAR - ADULT  Goal:

## 2020-08-31 VITALS
RESPIRATION RATE: 16 BRPM | DIASTOLIC BLOOD PRESSURE: 70 MMHG | HEIGHT: 62 IN | WEIGHT: 179 LBS | HEART RATE: 84 BPM | OXYGEN SATURATION: 95 % | TEMPERATURE: 98 F | SYSTOLIC BLOOD PRESSURE: 119 MMHG | BODY MASS INDEX: 32.94 KG/M2

## 2020-08-31 LAB
ATRIAL RATE: 72 BPM
P AXIS: -15 DEGREES
P-R INTERVAL: 178 MS
Q-T INTERVAL: 434 MS
QRS DURATION: 72 MS
QTC CALCULATION (BEZET): 475 MS
R AXIS: 21 DEGREES
T AXIS: -55 DEGREES
VENTRICULAR RATE: 72 BPM

## 2020-08-31 PROCEDURE — 92960 CARDIOVERSION ELECTRIC EXT: CPT

## 2020-08-31 PROCEDURE — 5A2204Z RESTORATION OF CARDIAC RHYTHM, SINGLE: ICD-10-PCS | Performed by: INTERNAL MEDICINE

## 2020-08-31 PROCEDURE — 93010 ELECTROCARDIOGRAM REPORT: CPT | Performed by: INTERNAL MEDICINE

## 2020-08-31 PROCEDURE — 93005 ELECTROCARDIOGRAM TRACING: CPT

## 2020-08-31 RX ORDER — DILTIAZEM HYDROCHLORIDE 240 MG/1
240 CAPSULE, COATED, EXTENDED RELEASE ORAL DAILY
Status: DISCONTINUED | OUTPATIENT
Start: 2020-08-31 | End: 2020-08-31

## 2020-08-31 RX ORDER — AMIODARONE HYDROCHLORIDE 400 MG/1
400 TABLET ORAL 2 TIMES DAILY WITH MEALS
Qty: 60 TABLET | Refills: 1 | Status: SHIPPED | OUTPATIENT
Start: 2020-08-31 | End: 2020-10-29 | Stop reason: DRUGHIGH

## 2020-08-31 RX ORDER — AMIODARONE HYDROCHLORIDE 200 MG/1
400 TABLET ORAL 2 TIMES DAILY WITH MEALS
Status: DISCONTINUED | OUTPATIENT
Start: 2020-08-31 | End: 2020-08-31

## 2020-08-31 NOTE — PROGRESS NOTES
[facility] Cardiology  Progress Note    Godfrey Diaz Patient Status:  Inpatient    6/10/1940 MRN [id number]   [facility] 8NE-A Attending Nano Govea MD   Hosp Day # 3 PCP Edward Rudd MD     Impression:  1.  AF with RVR lb (79.8 kg)  06/11/20 : 170 lb 12.8 oz (77.5 kg)      General: Awake and alert; in no acute distress  Cardiac: irregularly irregular; no murmurs/rubs/gallops are appreciated  Lungs: Clear to auscultation bilaterally; no accessory muscle use  Abdomen: Soft

## 2020-08-31 NOTE — PROCEDURES
[facility] Cardiology  Cardioversion Report    PREOPERATIVE DIAGNOSIS:  Atrial fibrillation. POSTOPERATIVE DIAGNOSIS:  Normal sinus rhythm. PROCEDURE PERFORMED:  Direct current cardioversion.      DESCRIPTION OF PROCEDURE:  The risks, benefi procedure. CONCLUSION:  Successful cardioversion to normal sinus rhythm with PACs.      Claude Quiroz MD  8/31/2020  11:00 AM

## 2020-08-31 NOTE — PLAN OF CARE
Pt is A&O X3, not in apparent distress on RA, s/p cardioversion, was SR with PAC`s for short periods of time and back to a-fib again, controlled rate.  Cpm.  Problem: CARDIOVASCULAR - ADULT  Goal: Maintains optimal cardiac output and hemodynamic stability

## 2020-08-31 NOTE — PROGRESS NOTES
S/P successful cardioversion. 200 J x 3. 30 mg Brevital given by Dr. Mario Wu. EKG done, SR with PACS. Report called to nurse.  transport ordered

## 2020-08-31 NOTE — PROGRESS NOTES
[facility] Hospitalist Progress Note     PCP: Reggie Balderrama MD    CC:  Follow up    SUBJECTIVE:      Remains in afib, HR are controlled. Denies sob or CP.         OBJECTIVE:  Temp:  [97.4 °F (36.3 °C)-97.7 °F (36.5 °C)] 97.4 °F (36.3 °C)  Pulse:  [81-91] 81  Res food     • diltiazem 5 mg/hr (08/30/20 6411)     acetaminophen, PEG 3350, magnesium hydroxide, bisacodyl, Fleet Enema, ondansetron HCl, Metoclopramide HCl       Assessment/Plan:     Principal Problem:    Atrial fibrillation with rapid ventricular response

## 2020-08-31 NOTE — PLAN OF CARE
Pt alert x 3- vital signs stable- tele a-fib controlled rate- cardizem drip infusing at 5mg/hr- pt denies any pain- lung sound diminished- sat room air 94%-  Plan of care explained to pt  Cardioversion in am- consent signed   Fall precaution- bed alarm/ fr

## 2020-09-03 PROBLEM — I48.91 ATRIAL FIBRILLATION WITH RAPID VENTRICULAR RESPONSE (HCC): Status: RESOLVED | Noted: 2020-08-28 | Resolved: 2020-09-03

## 2020-09-22 NOTE — CONSULTS
[facility]  Report of Consultation    Flo Negron Patient Status:  Inpatient    6/10/1940 MRN [id number]   [facility] 8NE-A Attending No att. providers found   Hosp Day # 3 PCP Frances Turpin MD     Reason kg)          General: Well developed, well nourished female. Pt is in no acute distress. HEENT:   Normocephalic. Atraumatic. Eyes with no scleral icterus. Neck: Supple. No JVD. Carotids 2+ and equal in symmetric fashion. No bruits are noted.   Donnie Hoffman

## 2021-01-17 NOTE — DISCHARGE SUMMARY
General Medicine Discharge Summary     Patient ID:  Maranda Frederick  79year old  6/10/1940    Admit date: 8/28/2020    Discharge date and time: 8/31/2020  5:15 PM     Attending Physician: Jenny att. providers as of 8/31/2020  4:43 PM    START taking these medications    metoprolol Tartrate 25 MG Oral Tab  Take 1 tablet (25 mg total) by mouth 2x Daily(Beta Blocker). , Normal, Disp-60 tablet, R-5    amiodarone HCl 400 MG Oral Tab  Take 1 tablet (400 mg total) by m

## 2024-12-08 ENCOUNTER — HOSPITAL ENCOUNTER (EMERGENCY)
Age: 84
Discharge: HOME | End: 2024-12-08
Attending: EMERGENCY MEDICINE
Payer: MEDICARE

## 2024-12-08 ENCOUNTER — APPOINTMENT (OUTPATIENT)
Dept: GENERAL RADIOLOGY | Age: 84
End: 2024-12-08
Payer: MEDICARE

## 2024-12-08 VITALS
DIASTOLIC BLOOD PRESSURE: 98 MMHG | HEIGHT: 60 IN | OXYGEN SATURATION: 97 % | SYSTOLIC BLOOD PRESSURE: 148 MMHG | TEMPERATURE: 97 F | RESPIRATION RATE: 18 BRPM | HEART RATE: 90 BPM | BODY MASS INDEX: 28.86 KG/M2 | WEIGHT: 147 LBS

## 2024-12-08 DIAGNOSIS — I48.91 ATRIAL FIBRILLATION WITH RAPID VENTRICULAR RESPONSE (HCC): Primary | ICD-10-CM

## 2024-12-08 LAB
ALBUMIN SERPL-MCNC: 4.1 G/DL (ref 3.2–4.8)
ALBUMIN/GLOB SERPL: 1.5 {RATIO} (ref 1–2)
ALP LIVER SERPL-CCNC: 136 U/L
ALT SERPL-CCNC: 17 U/L
ANION GAP SERPL CALC-SCNC: 6 MMOL/L (ref 0–18)
AST SERPL-CCNC: 29 U/L (ref ?–34)
ATRIAL RATE: 91 BPM
BASOPHILS # BLD AUTO: 0.03 X10(3) UL (ref 0–0.2)
BASOPHILS NFR BLD AUTO: 0.6 %
BILIRUB SERPL-MCNC: 0.7 MG/DL (ref 0.2–1.1)
BUN BLD-MCNC: 19 MG/DL (ref 9–23)
CALCIUM BLD-MCNC: 9.3 MG/DL (ref 8.7–10.4)
CHLORIDE SERPL-SCNC: 108 MMOL/L (ref 98–112)
CO2 SERPL-SCNC: 27 MMOL/L (ref 21–32)
CREAT BLD-MCNC: 0.95 MG/DL
EGFRCR SERPLBLD CKD-EPI 2021: 59 ML/MIN/1.73M2 (ref 60–?)
EOSINOPHIL # BLD AUTO: 0.04 X10(3) UL (ref 0–0.7)
EOSINOPHIL NFR BLD AUTO: 0.8 %
ERYTHROCYTE [DISTWIDTH] IN BLOOD BY AUTOMATED COUNT: 13.2 %
GLOBULIN PLAS-MCNC: 2.7 G/DL (ref 2–3.5)
GLUCOSE BLD-MCNC: 83 MG/DL (ref 70–99)
HCT VFR BLD AUTO: 49.9 %
HGB BLD-MCNC: 16.3 G/DL
IMM GRANULOCYTES # BLD AUTO: 0.02 X10(3) UL (ref 0–1)
IMM GRANULOCYTES NFR BLD: 0.4 %
LYMPHOCYTES # BLD AUTO: 1.19 X10(3) UL (ref 1–4)
LYMPHOCYTES NFR BLD AUTO: 24.7 %
MAGNESIUM SERPL-MCNC: 1.9 MG/DL (ref 1.6–2.6)
MCH RBC QN AUTO: 31.4 PG (ref 26–34)
MCHC RBC AUTO-ENTMCNC: 32.7 G/DL (ref 31–37)
MCV RBC AUTO: 96.1 FL
MONOCYTES # BLD AUTO: 0.33 X10(3) UL (ref 0.1–1)
MONOCYTES NFR BLD AUTO: 6.8 %
NEUTROPHILS # BLD AUTO: 3.21 X10 (3) UL (ref 1.5–7.7)
NEUTROPHILS # BLD AUTO: 3.21 X10(3) UL (ref 1.5–7.7)
NEUTROPHILS NFR BLD AUTO: 66.7 %
OSMOLALITY SERPL CALC.SUM OF ELEC: 293 MOSM/KG (ref 275–295)
PLATELET # BLD AUTO: 230 10(3)UL (ref 150–450)
POTASSIUM SERPL-SCNC: 4 MMOL/L (ref 3.5–5.1)
PROT SERPL-MCNC: 6.8 G/DL (ref 5.7–8.2)
Q-T INTERVAL: 358 MS
QRS DURATION: 82 MS
QTC CALCULATION (BEZET): 475 MS
R AXIS: -28 DEGREES
RBC # BLD AUTO: 5.19 X10(6)UL
SODIUM SERPL-SCNC: 141 MMOL/L (ref 136–145)
T AXIS: 38 DEGREES
TROPONIN I SERPL HS-MCNC: 8 NG/L
VENTRICULAR RATE: 106 BPM
WBC # BLD AUTO: 4.8 X10(3) UL (ref 4–11)

## 2024-12-08 PROCEDURE — 99285 EMERGENCY DEPT VISIT HI MDM: CPT

## 2024-12-08 PROCEDURE — 84484 ASSAY OF TROPONIN QUANT: CPT | Performed by: EMERGENCY MEDICINE

## 2024-12-08 PROCEDURE — 85025 COMPLETE CBC W/AUTO DIFF WBC: CPT | Performed by: EMERGENCY MEDICINE

## 2024-12-08 PROCEDURE — 80053 COMPREHEN METABOLIC PANEL: CPT | Performed by: EMERGENCY MEDICINE

## 2024-12-08 PROCEDURE — 85025 COMPLETE CBC W/AUTO DIFF WBC: CPT

## 2024-12-08 PROCEDURE — 71045 X-RAY EXAM CHEST 1 VIEW: CPT | Performed by: EMERGENCY MEDICINE

## 2024-12-08 PROCEDURE — 80053 COMPREHEN METABOLIC PANEL: CPT

## 2024-12-08 PROCEDURE — 83735 ASSAY OF MAGNESIUM: CPT | Performed by: EMERGENCY MEDICINE

## 2024-12-08 PROCEDURE — 93010 ELECTROCARDIOGRAM REPORT: CPT

## 2024-12-08 PROCEDURE — 96374 THER/PROPH/DIAG INJ IV PUSH: CPT

## 2024-12-08 PROCEDURE — 84484 ASSAY OF TROPONIN QUANT: CPT

## 2024-12-08 PROCEDURE — 93005 ELECTROCARDIOGRAM TRACING: CPT

## 2024-12-08 RX ORDER — METOPROLOL TARTRATE 1 MG/ML
5 INJECTION, SOLUTION INTRAVENOUS ONCE
Status: COMPLETED | OUTPATIENT
Start: 2024-12-08 | End: 2024-12-08

## 2024-12-08 NOTE — ED PROVIDER NOTES
Patient Seen in: [facility] In [place]      History     Chief Complaint   Patient presents with    Arrythmia/Palpitations     Stated Complaint: high HR    Subjective:   HPI      Patient presents with tachycardia and possible A-fib.  The patient states that her watch showed possible A-fib today and her heart rate was running much higher than usual.  She states she is typically around 61 and actually fairly recently her doctor decreased her metoprolol dose because of that.  She has a history of A-fib initially diagnosed several years ago but is usually in a sinus rhythm.  She states that she feels fatigued and slightly lightheaded but no chest pain or shortness of breath.  She is on Xarelto for anticoagulation.    Objective:     Past Medical History:    Essential hypertension    Hyperlipidemia    Hypothyroid              Past Surgical History:   Procedure Laterality Date    Appendectomy            Cholecystectomy      Knee arthroscopy Bilateral                 Social History     Socioeconomic History    Marital status:    Tobacco Use    Smoking status: Never    Smokeless tobacco: Never   Vaping Use    Vaping status: Never Used   Substance and Sexual Activity    Alcohol use: No     Alcohol/week: 0.0 standard drinks of alcohol     Comment: rarely will have a glass of wine    Drug use: No    Sexual activity: Not Currently                  Physical Exam     ED Triage Vitals [24 1232]   BP (!) 166/100   Pulse 117   Resp 16   Temp 97.4 °F (36.3 °C)   Temp src Temporal   SpO2 97 %   O2 Device None (Room air)       Current Vitals:   Vital Signs  BP: (!) 148/98  Pulse: 90  Resp: 18  Temp: 97.4 °F (36.3 °C)  Temp src: Temporal    Oxygen Therapy  SpO2: 97 %  O2 Device: None (Room air)        Physical Exam  General: Alert and oriented x3 in no acute distress.  Cardiovascular: Slightly irregular and slightly tachycardic.  Respiratory: Lungs clear to auscultation.  Extremities: No  CCE.  Skin: Warm and dry.    ED Course     Labs Reviewed   COMP METABOLIC PANEL (14) - Abnormal; Notable for the following components:       Result Value    eGFR-Cr 59 (*)     All other components within normal limits   CBC WITH DIFFERENTIAL WITH PLATELET - Abnormal; Notable for the following components:    HGB 16.3 (*)     HCT 49.9 (*)     All other components within normal limits   TROPONIN I HIGH SENSITIVITY - Normal   MAGNESIUM - Normal   RAINBOW DRAW BLUE     EKG    Rate, intervals and axes as noted on EKG Report.  Rate: 106  Rhythm: Atrial fibrillation with rapid ventricular response  Reading: Septal Q waves, mild artifact, no acute ischemic abnormality         I personally reviewed the chest films and hiatal hernia, no focal infiltrate and no pulmonary edema noted.       XR CHEST AP PORTABLE  (CPT=71045)    Result Date: 12/8/2024  PROCEDURE:  XR CHEST AP PORTABLE  (CPT=71045)  TECHNIQUE:  AP chest radiograph was obtained.  COMPARISON:  WAYLON , BHARGAV, XR CHEST AP PORTABLE  (CPT=71045), 8/28/2020, 1:47 PM.  INDICATIONS:  high HR  PATIENT STATED HISTORY: (As transcribed by Technologist)  Patient noticed her blood pressure was high and her heart rate today.  The last time this happened was in 2020 and she had a stroke and was in heart faliure.                CONCLUSION:   Stable cardiac and mediastinal contours including a suspected large hiatal hernia.  Patchy left infrahilar opacity may be atelectatic or inflammatory.  Elsewhere, the lungs are clear.  No pleural effusion or pneumothorax.   LOCATION:  [facility]      Dictated by (CST): Cindy Horowitz MD on 12/08/2024 at 1:24 PM     Finalized by (CST): Cindy Horowitz MD on 12/08/2024 at 1:25 PM        Medications   metoprolol (Lopressor) 5 mg/5mL injection 5 mg (5 mg Intravenous Given 12/8/24 1333)     Patient was given a dose of IV Lopressor.  Her heart rate improved and was consistently under 100.     MDM      Patient presents with tachycardia and fatigue.  Differential  diagnosis includes but is not limited to atrial fibrillation with rapid ventricular response, electrolyte abnormalities, dehydration and pulmonary edema.  The patient does appear to be in A-fib with RVR.  Her labs have come back reassuring with normal electrolytes and negative cardiac enzymes.  She does not have evidence of pulmonary edema on chest x-ray.  She appears very well.  I discussed her case with Dr. Cantu from [facility] cardiology.  He felt comfortable with the patient going back up on her metoprolol dose and following up with them in the clinic.  She is already anticoagulated.  I counseled her regarding symptoms to watch for and when to return to the emergency department.  She is comfortable with the plan.        Medical Decision Making      Disposition and Plan     Clinical Impression:  1. Atrial fibrillation with rapid ventricular response (HCC)         Disposition:  Discharge  12/8/2024  3:47 pm    Follow-up:  Vinayak Tomas MD  [address] [place] [address]  [address]  [facility] [id number] [phone]    Call in 1 day(s)            Medications Prescribed:  Discharge Medication List as of 12/8/2024  3:47 PM              Supplementary Documentation:

## 2025-02-23 ENCOUNTER — APPOINTMENT (OUTPATIENT)
Dept: GENERAL RADIOLOGY | Age: 85
End: 2025-02-23
Attending: EMERGENCY MEDICINE
Payer: MEDICARE

## 2025-02-23 ENCOUNTER — APPOINTMENT (OUTPATIENT)
Dept: CT IMAGING | Age: 85
End: 2025-02-23
Attending: EMERGENCY MEDICINE
Payer: MEDICARE

## 2025-02-23 ENCOUNTER — HOSPITAL ENCOUNTER (EMERGENCY)
Age: 85
Discharge: HOME | End: 2025-02-23
Attending: EMERGENCY MEDICINE
Payer: MEDICARE

## 2025-02-23 VITALS
HEIGHT: 62 IN | TEMPERATURE: 97 F | SYSTOLIC BLOOD PRESSURE: 155 MMHG | RESPIRATION RATE: 18 BRPM | WEIGHT: 147 LBS | HEART RATE: 93 BPM | DIASTOLIC BLOOD PRESSURE: 112 MMHG | BODY MASS INDEX: 27.05 KG/M2 | OXYGEN SATURATION: 97 %

## 2025-02-23 DIAGNOSIS — S00.83XA TRAUMATIC HEMATOMA OF FOREHEAD, INITIAL ENCOUNTER: Primary | ICD-10-CM

## 2025-02-23 DIAGNOSIS — S80.00XA CONTUSION OF KNEE, UNSPECIFIED LATERALITY, INITIAL ENCOUNTER: ICD-10-CM

## 2025-02-23 DIAGNOSIS — S29.012A STRAIN OF THORACIC BACK REGION: ICD-10-CM

## 2025-02-23 PROCEDURE — 70450 CT HEAD/BRAIN W/O DYE: CPT | Performed by: EMERGENCY MEDICINE

## 2025-02-23 PROCEDURE — 99284 EMERGENCY DEPT VISIT MOD MDM: CPT

## 2025-02-23 PROCEDURE — 72072 X-RAY EXAM THORAC SPINE 3VWS: CPT | Performed by: EMERGENCY MEDICINE

## 2025-02-23 RX ORDER — ACETAMINOPHEN 500 MG
1000 TABLET ORAL ONCE
Status: COMPLETED | OUTPATIENT
Start: 2025-02-23 | End: 2025-02-23

## 2025-02-24 NOTE — ED PROVIDER NOTES
Patient Seen in: [facility] In [place]      History     Chief Complaint   Patient presents with    Head Neck Injury     Stated Complaint: hit head outside on the blacktop    Subjective:   HPI      Patient was taking the garbage out and hit a patch of ice causing her to fall forward.  She landed on both knees which have been replaced and struck her forehead.  She never lost consciousness.  She recalls events very well.  No visual change or dental injury since the fall.  Patient denies any neck pain.  She does complain of some thoracic back pain.  No lower back pain.  No chest pain.  No abdominal pain.  Daughter notes sevilla on her jeans from when she fell.  Patient denying any extraordinary discomforts in her knees..  No no numbness or weakness of her body..  No nausea or vomiting.    Objective:     Past Medical History:    Essential hypertension    Hyperlipidemia    Hypothyroid              Past Surgical History:   Procedure Laterality Date    Appendectomy            Cholecystectomy      Knee arthroscopy Bilateral                 Social History     Socioeconomic History    Marital status:    Tobacco Use    Smoking status: Never    Smokeless tobacco: Never   Vaping Use    Vaping status: Never Used   Substance and Sexual Activity    Alcohol use: No     Alcohol/week: 0.0 standard drinks of alcohol     Comment: rarely will have a glass of wine    Drug use: No    Sexual activity: Not Currently                  Physical Exam     ED Triage Vitals [25]   BP (!) 152/101   Pulse 110   Resp 18   Temp 97.3 °F (36.3 °C)   Temp src Oral   SpO2 97 %   O2 Device None (Room air)       Current Vitals:   Vital Signs  BP: (!) 152/101  Pulse: 110  Resp: 18  Temp: 97.3 °F (36.3 °C)  Temp src: Oral    Oxygen Therapy  SpO2: 97 %  O2 Device: None (Room air)        Physical Exam  General: The patient is awake, alert, conversant.  She is very quickwitted and talkative  Forehead with a large  hematoma noted  Eyes: sclera white, conjunctiva pink and moist.  Lids and lashes are normal.  Pupils equal, round, and reactive to light  Nose atraumatic  Jaw has good nontender active range of motion.  Oromucosa and lips are moist  Neck: Nontender in the midline and paraspinal musculature with good active nontender range of motion  Back: Quite kyphotic with mild diffuse tenderness throughout the mid thoracic back.  No bony deformity or bruising is noted.  Lumbar back nontender  Chest: Nontender  Abdomen: Completely nontender  Extremities: Both knees with large well-healed scars from her knee replacements.  Left knee with subtle ecchymosis and soft tissue swelling noted.  Both with good nontender active range of motion.  Calves nonswollen, symmetric, nontender.  No pedal edema.  Skin: Somewhat pale  Neurologic:  Mental status as above.  Patient moves all extremities with good strength and coordination.        ED Course   Labs Reviewed - No data to display                MDM      Patient is suffered a nonsyncopal fall.  She has a large hematoma over the forehead.  Intracranial injury such as skull fracture or subdural hematoma must be excluded.  She is also suffered contusion to both of her knees although only minimal bruising is noted currently, I expect she will experience increased discomfort, swelling and bruising with time.  Patient has some thoracic back pain.  This is likely strain.  Compression fracture also consideration.  Fortunately no neck pain, even with full active range of motion.  Patient treated with Tylenol        CT brain  I personally reviewed the actual radiographs themselves and my individual interpretation shows no skull fracture or subdural hematoma  radiologist's formal interpretation which I have reviewed  CONCLUSION:   1. No acute intracranial findings.  2.7 cm left frontal scalp hematoma.   2. Cerebral atrophy with chronic microvascular ischemic changes.  Multifocal remote infarcts.      Thoracic spine  CONCLUSION:  Multiple wedge-shaped compression deformities involving the upper thoracic vertebral bodies, which are age-indeterminate.  This can be further assessed with thoracic spine MRI as clinically indicated.         I reviewed the results of the workup with patient and her daughter.    I discussed with patient and daughter that I cannot determine whether the multiple compression deformities are new.  Without point tenderness, I suspect they are old and she has more strain.  Certainly, this could be followed up as an outpatient if she has persistent complaints.    I recommend:  Light diet  Rest  Tylenol for pain  Apply cool compresses to your injuries 20 minutes at a time    Contact your primary care doctor in the morning to arrange close follow-up    Review the head injury instructions provided    Medical Decision Making      Disposition and Plan     Clinical Impression:  1. Traumatic hematoma of forehead, initial encounter    2. Strain of thoracic back region    3. Contusion of knee, unspecified laterality, initial encounter         Disposition:  Discharge  2/23/2025  9:08 pm    Follow-up:  Yonathan De La Cruz MD  [address] [id number] [phone]    Call in 1 day(s)            Medications Prescribed:  Current Discharge Medication List              Supplementary Documentation:

## 2025-02-24 NOTE — DISCHARGE INSTRUCTIONS
Light diet  Rest  Tylenol for pain  Apply cool compresses to your injuries 20 minutes at a time    Contact your primary care doctor in the morning to arrange close follow-up    Review the head injury instructions provided

## 2025-02-24 NOTE — ED INITIAL ASSESSMENT (HPI)
Pt reports she fell outside of her house about 1 hr PTA. Pt landed face first onto asphalt, swelling to forehead. Pt does take blood thinners. No LOC, dizziness, confusion. Pupils equal/reactive. Besides feeling \"dazed\" no neuro complaints.

## 2025-04-30 NOTE — PAT NURSING NOTE
PreOp Instructions     You are scheduled for: a Cardiac Procedure     Date of Procedure: 05/05/25     Diet Instructions: Do not eat or drink anything after midnight including gum, mints, candy, etc.     Medications: Medications you are allowed to take can be taken with a sip of water the morning of your procedure. DO NOT MISS any Xarelto doses.     Medications to Stop: DO NOT TAKE any herbal supplements and vitamins the morning of your procedure.     Arrival Time: The Friday prior to your procedure you will receive a phone call between 3:00 pm - 6:00 pm with your arrival time. If you haven't received a phone call, please check your voicemail messages., If you did not receive a voice mail and it is after 6:00 pm, please call the nursing supervisor at [phone].    Driving After Procedure: Sedation will be given so you WILL NOT be able to drive home. You will need a responsible adult  to drive you home. You can NOT take uber or taxi unless approved by your physician in advance.     Discharge Teaching: Your nurse will give you specific instructions before discharge, Most people can resume normal activities in 2-3 days, Any questions, please call the physician's office

## 2025-05-05 ENCOUNTER — HOSPITAL ENCOUNTER (OUTPATIENT)
Dept: INTERVENTIONAL RADIOLOGY/VASCULAR | Facility: HOSPITAL | Age: 85
Discharge: HOME | End: 2025-05-05
Attending: INTERNAL MEDICINE | Admitting: INTERNAL MEDICINE
Payer: MEDICARE

## 2025-05-05 VITALS
RESPIRATION RATE: 24 BRPM | BODY MASS INDEX: 27.23 KG/M2 | WEIGHT: 148 LBS | HEIGHT: 62 IN | OXYGEN SATURATION: 96 % | SYSTOLIC BLOOD PRESSURE: 143 MMHG | HEART RATE: 54 BPM | DIASTOLIC BLOOD PRESSURE: 75 MMHG | TEMPERATURE: 97 F

## 2025-05-05 DIAGNOSIS — I48.91 A-FIB (HCC): ICD-10-CM

## 2025-05-05 LAB
ATRIAL RATE: 53 BPM
P AXIS: 55 DEGREES
P-R INTERVAL: 188 MS
Q-T INTERVAL: 448 MS
QRS DURATION: 76 MS
QTC CALCULATION (BEZET): 420 MS
R AXIS: -7 DEGREES
T AXIS: -25 DEGREES
VENTRICULAR RATE: 53 BPM

## 2025-05-05 PROCEDURE — 93010 ELECTROCARDIOGRAM REPORT: CPT | Performed by: INTERNAL MEDICINE

## 2025-05-05 PROCEDURE — 93005 ELECTROCARDIOGRAM TRACING: CPT

## 2025-05-05 PROCEDURE — 92960 CARDIOVERSION ELECTRIC EXT: CPT | Performed by: INTERNAL MEDICINE

## 2025-05-05 RX ORDER — METHOHEXITAL IN WATER/PF 100MG/10ML
SYRINGE (ML) INTRAVENOUS
Status: COMPLETED
Start: 2025-05-05 | End: 2025-05-05

## 2025-05-05 RX ORDER — SODIUM CHLORIDE 9 MG/ML
INJECTION, SOLUTION INTRAVENOUS CONTINUOUS
Status: DISCONTINUED | OUTPATIENT
Start: 2025-05-05 | End: 2025-05-05

## 2025-05-05 RX ORDER — METHOHEXITAL IN WATER/PF 100MG/10ML
SYRINGE (ML) INTRAVENOUS
Status: DISCONTINUED | OUTPATIENT
Start: 2025-05-05 | End: 2025-05-05

## 2025-05-05 NOTE — H&P
[facility]  Pre Procedure History and Physical    [facility]ia [facility] Patient Status:  Outpatient    6/10/1940 MRN [id number]   Location [facility] INTERVENTIONAL SUITES Attending No att. providers found   Hosp Day # 0 PCP Yonathan De La Cruz MD     Consults      History of Present Illness:  Virginia Hill is a a(n) 84 year old female here for DCCV      Prior H/P Reviewed with no changes    History:  Past Medical History[1]  Past Surgical History[2]  Family History[3]   reports that she has never smoked. She has never used smokeless tobacco. She reports that she does not drink alcohol and does not use drugs.    Allergies:  Allergies[4]    Medications:  Current Hospital Medications[5]    OBJECTIVE      Physical Exam:  Physical Exam   Blood pressure 143/75, pulse 54, temperature 97.4 °F (36.3 °C), temperature source Temporal, resp. rate 24, height 5' 2\" (1.575 m), weight 148 lb (67.1 kg), SpO2 96%.  Temp (24hrs), Av.4 °F (36.3 °C), Min:97.4 °F (36.3 °C), Max:97.4 °F (36.3 °C)    Wt Readings from Last 3 Encounters:   25 148 lb (67.1 kg)   25 147 lb (66.7 kg)   24 147 lb (66.7 kg)       NAD  PERRLA/EOMI  Neck veins not elevated  Carotids- no bruits  CTA bilaterally  Cardiac- irreg irreg  Abdomen- Soft,Nontender, normal BS  Extremities- pulses normal  Edema- none  Mood /Affect Congruent  Skin- no lesions          Results:   No results for input(s): \"GLU\", \"BUN\", \"CREATSERUM\", \"GFRAA\", \"GFRNAA\", \"EGFRCR\", \"CA\", \"NA\", \"K\", \"CL\", \"CO2\" in the last 168 hours.  No results for input(s): \"RBC\", \"HGB\", \"HCT\", \"MCV\", \"MCH\", \"MCHC\", \"RDW\", \"NEPRELIM\", \"WBC\", \"PLT\" in the last 168 hours.      [unfilled]  No results for input(s): \"BNP\" in the last 168 hours.  No results found for: \"PT\", \"INR\"  Lab Results   Component Value Date    TROP <0.045 2020    TROP <0.045 2020         No results found.       Assessment and Plan    Problem List[6]    Consent: Risks, Benefits and alternatives discussed in clinic.  Reverified on the day of the procedure    Procedure Planned: DCCV    Sedation Plan: Brevital    Discharge Plan: Same day      Belle Springer MD  Cardiac Electrophysiology  [facility]  2025  3:01 PM         [1]   Past Medical History:   Arrhythmia    Cardiomyopathy (HCC)    Congestive heart disease (HCC)    Essential hypertension    High blood pressure    High cholesterol    Hyperlipidemia    Hypothyroid    Stroke (HCC)   [2]   Past Surgical History:  Procedure Laterality Date    Appendectomy            Cholecystectomy      Knee arthroscopy Bilateral    [3]   Family History  Problem Relation Age of Onset    Stroke Mother     Heart Disorder Maternal Grandfather     Cancer Paternal Grandmother     Heart Disorder Father    [4]   Allergies  Allergen Reactions    Codeine HIVES   [5]   Current Facility-Administered Medications:     sodium chloride 0.9% infusion, , Intravenous, Continuous    methohexital (BrevITAL) 100 mg/10mL IV syringe 10-40 mg, 10-40 mg, Intravenous, Q5 Min PRN  [6]   Patient Active Problem List  Diagnosis    Benign essential hypertension    Mixed hyperlipidemia    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

## 2025-05-05 NOTE — PROGRESS NOTES
Pt post cardioversion  After adequate sedation per Dr. Springer, pt cardioverted at 200jx1. Pt converted to NSR per EKG.    Pt awake.  Recovery complete per protocol. Vss. Discharge instructions reviewed, iv dc'd and pt discharged home with daughter driving.

## 2025-05-05 NOTE — PROCEDURES
Electrophysiology Procedure Note    [place] Location: Cath Lab   CSN [id number] MRN [id number]   Admission Date 5/5/2025  Operation Date 05/05/25    Attending Physician Belle Springer MD Operating Physician Belle Springer MD     Pre-Operative Diagnosis:Atrial Fibrillation    Post-Operative Diagnosis: Same as above  PROCEDURE(S) PERFORMED:    1.    Cardioversion.  2.     Sedation      :  Belle Springer MD     ANESTHESIA:  IV sedation.  Moderate conscious sedation for this procedure was administered and personally monitored. Brevital 40 mg  Sedation time: 10 minutes     INDICATION:  Persistent Atrial Fibrillation     COMPLICATIONS:  None.     METHODS:  The patient was brought to the outpatient cardiac telemetry unit in a fasting and nonsedated state after providing informed consent.  IV sedation was administered during continuous ECG, pulse oximeter and noninvasive hemodynamic monitoring.  After administering IV Brevital in intermittent boluses, the desired level of sedation was achieved.      Cardioversion Energy:  200J    Pad Position: Base-[place]  Pre- Cardioversion Rhythm- Afib  Post Cardioversion Rhythm - /NSR    CONCLUSIONS:  1.    Successful cardioversion       Plan:  1- Rhythm/Rate Control Meds: no changes  2) Anticoagulation- no changes  3) Follow Up- 1 month              Belle Springer MD     Cardiac Electrophysiololgy  [facility]

## 2025-08-07 RX ORDER — MOXIFLOXACIN 5 MG/ML
1 SOLUTION/ DROPS OPHTHALMIC AS DIRECTED
COMMUNITY

## 2025-08-07 RX ORDER — PREDNISOLONE ACETATE 10 MG/ML
1 SUSPENSION/ DROPS OPHTHALMIC AS DIRECTED
COMMUNITY

## 2025-08-08 ENCOUNTER — ANESTHESIA EVENT (OUTPATIENT)
Dept: INTERVENTIONAL RADIOLOGY/VASCULAR | Facility: HOSPITAL | Age: 85
End: 2025-08-08

## 2025-08-13 ENCOUNTER — HOSPITAL ENCOUNTER (OUTPATIENT)
Dept: INTERVENTIONAL RADIOLOGY/VASCULAR | Facility: HOSPITAL | Age: 85
Discharge: HOME | End: 2025-08-13
Attending: INTERNAL MEDICINE | Admitting: INTERNAL MEDICINE

## 2025-08-13 ENCOUNTER — ANESTHESIA (OUTPATIENT)
Dept: INTERVENTIONAL RADIOLOGY/VASCULAR | Facility: HOSPITAL | Age: 85
End: 2025-08-13

## 2025-08-13 VITALS
BODY MASS INDEX: 26.87 KG/M2 | OXYGEN SATURATION: 93 % | WEIGHT: 146 LBS | HEIGHT: 62 IN | SYSTOLIC BLOOD PRESSURE: 119 MMHG | RESPIRATION RATE: 18 BRPM | TEMPERATURE: 97 F | HEART RATE: 46 BPM | DIASTOLIC BLOOD PRESSURE: 61 MMHG

## 2025-08-13 DIAGNOSIS — I10 ESSENTIAL HYPERTENSION: ICD-10-CM

## 2025-08-13 DIAGNOSIS — E78.5 DYSLIPIDEMIA: ICD-10-CM

## 2025-08-13 DIAGNOSIS — Z79.899 ON AMIODARONE THERAPY: ICD-10-CM

## 2025-08-13 DIAGNOSIS — I48.91 A-FIB (HCC): ICD-10-CM

## 2025-08-13 DIAGNOSIS — I48.0 PAF (PAROXYSMAL ATRIAL FIBRILLATION) (HCC): ICD-10-CM

## 2025-08-13 DIAGNOSIS — I42.8 OTHER CARDIOMYOPATHY (HCC): ICD-10-CM

## 2025-08-13 LAB
ISTAT ACTIVATED CLOTTING TIME: 147 SECONDS (ref 74–137)
ISTAT ACTIVATED CLOTTING TIME: 533 SECONDS (ref 74–137)
ISTAT ACTIVATED CLOTTING TIME: 538 SECONDS (ref 74–137)

## 2025-08-13 PROCEDURE — 93656 COMPRE EP EVAL ABLTJ ATR FIB: CPT | Performed by: INTERNAL MEDICINE

## 2025-08-13 PROCEDURE — 93655 ICAR CATH ABLTJ DSCRT ARRHYT: CPT | Performed by: INTERNAL MEDICINE

## 2025-08-13 PROCEDURE — 85347 COAGULATION TIME ACTIVATED: CPT

## 2025-08-13 PROCEDURE — 93657 TX L/R ATRIAL FIB ADDL: CPT | Performed by: INTERNAL MEDICINE

## 2025-08-13 RX ORDER — DEXAMETHASONE SODIUM PHOSPHATE 4 MG/ML
VIAL (ML) INJECTION AS NEEDED
Status: DISCONTINUED | OUTPATIENT
Start: 2025-08-13 | End: 2025-08-13 | Stop reason: SURG

## 2025-08-13 RX ORDER — SODIUM CHLORIDE, SODIUM LACTATE, POTASSIUM CHLORIDE, CALCIUM CHLORIDE 600; 310; 30; 20 MG/100ML; MG/100ML; MG/100ML; MG/100ML
INJECTION, SOLUTION INTRAVENOUS CONTINUOUS
Status: DISCONTINUED | OUTPATIENT
Start: 2025-08-13 | End: 2025-08-13 | Stop reason: HOSPADM

## 2025-08-13 RX ORDER — HEPARIN SODIUM 1000 [USP'U]/ML
INJECTION, SOLUTION INTRAVENOUS; SUBCUTANEOUS AS NEEDED
Status: DISCONTINUED | OUTPATIENT
Start: 2025-08-13 | End: 2025-08-13 | Stop reason: SURG

## 2025-08-13 RX ORDER — HYDROMORPHONE HYDROCHLORIDE 1 MG/ML
0.4 INJECTION, SOLUTION INTRAMUSCULAR; INTRAVENOUS; SUBCUTANEOUS EVERY 5 MIN PRN
Status: DISCONTINUED | OUTPATIENT
Start: 2025-08-13 | End: 2025-08-13 | Stop reason: HOSPADM

## 2025-08-13 RX ORDER — LIDOCAINE HYDROCHLORIDE 10 MG/ML
INJECTION, SOLUTION EPIDURAL; INFILTRATION; INTRACAUDAL; PERINEURAL AS NEEDED
Status: DISCONTINUED | OUTPATIENT
Start: 2025-08-13 | End: 2025-08-13 | Stop reason: SURG

## 2025-08-13 RX ORDER — PROTAMINE SULFATE 10 MG/ML
INJECTION, SOLUTION INTRAVENOUS AS NEEDED
Status: DISCONTINUED | OUTPATIENT
Start: 2025-08-13 | End: 2025-08-13 | Stop reason: SURG

## 2025-08-13 RX ORDER — HYDROCODONE BITARTRATE AND ACETAMINOPHEN 5; 325 MG/1; MG/1
2 TABLET ORAL ONCE AS NEEDED
Status: DISCONTINUED | OUTPATIENT
Start: 2025-08-13 | End: 2025-08-13 | Stop reason: HOSPADM

## 2025-08-13 RX ORDER — HEPARIN SODIUM 5000 [USP'U]/ML
INJECTION, SOLUTION INTRAVENOUS; SUBCUTANEOUS
Status: COMPLETED
Start: 2025-08-13 | End: 2025-08-13

## 2025-08-13 RX ORDER — HEPARIN SODIUM 1000 [USP'U]/ML
INJECTION, SOLUTION INTRAVENOUS; SUBCUTANEOUS
Status: COMPLETED
Start: 2025-08-13 | End: 2025-08-13

## 2025-08-13 RX ORDER — HYDROCODONE BITARTRATE AND ACETAMINOPHEN 5; 325 MG/1; MG/1
1 TABLET ORAL ONCE AS NEEDED
Status: DISCONTINUED | OUTPATIENT
Start: 2025-08-13 | End: 2025-08-13 | Stop reason: HOSPADM

## 2025-08-13 RX ORDER — ACETAMINOPHEN 500 MG
1000 TABLET ORAL ONCE AS NEEDED
Status: DISCONTINUED | OUTPATIENT
Start: 2025-08-13 | End: 2025-08-13 | Stop reason: HOSPADM

## 2025-08-13 RX ORDER — HYDROMORPHONE HYDROCHLORIDE 1 MG/ML
0.6 INJECTION, SOLUTION INTRAMUSCULAR; INTRAVENOUS; SUBCUTANEOUS EVERY 5 MIN PRN
Status: DISCONTINUED | OUTPATIENT
Start: 2025-08-13 | End: 2025-08-13 | Stop reason: HOSPADM

## 2025-08-13 RX ORDER — LIDOCAINE HYDROCHLORIDE AND EPINEPHRINE 10; 10 MG/ML; UG/ML
INJECTION, SOLUTION INFILTRATION; PERINEURAL
Status: COMPLETED
Start: 2025-08-13 | End: 2025-08-13

## 2025-08-13 RX ORDER — ONDANSETRON 2 MG/ML
INJECTION INTRAMUSCULAR; INTRAVENOUS AS NEEDED
Status: DISCONTINUED | OUTPATIENT
Start: 2025-08-13 | End: 2025-08-13 | Stop reason: SURG

## 2025-08-13 RX ORDER — AMIODARONE HYDROCHLORIDE 200 MG/1
100 TABLET ORAL DAILY
Qty: 15 TABLET | Refills: 0 | Status: SHIPPED | OUTPATIENT
Start: 2025-08-13

## 2025-08-13 RX ORDER — ROCURONIUM BROMIDE 10 MG/ML
INJECTION, SOLUTION INTRAVENOUS AS NEEDED
Status: DISCONTINUED | OUTPATIENT
Start: 2025-08-13 | End: 2025-08-13 | Stop reason: SURG

## 2025-08-13 RX ORDER — NALOXONE HYDROCHLORIDE 0.4 MG/ML
0.08 INJECTION, SOLUTION INTRAMUSCULAR; INTRAVENOUS; SUBCUTANEOUS AS NEEDED
Status: DISCONTINUED | OUTPATIENT
Start: 2025-08-13 | End: 2025-08-13 | Stop reason: HOSPADM

## 2025-08-13 RX ORDER — SODIUM CHLORIDE 9 MG/ML
INJECTION, SOLUTION INTRAVENOUS
Status: COMPLETED | OUTPATIENT
Start: 2025-08-14 | End: 2025-08-13

## 2025-08-13 RX ORDER — GLYCOPYRROLATE 0.2 MG/ML
INJECTION, SOLUTION INTRAMUSCULAR; INTRAVENOUS AS NEEDED
Status: DISCONTINUED | OUTPATIENT
Start: 2025-08-13 | End: 2025-08-13 | Stop reason: SURG

## 2025-08-13 RX ORDER — ONDANSETRON 2 MG/ML
4 INJECTION INTRAMUSCULAR; INTRAVENOUS EVERY 6 HOURS PRN
Status: DISCONTINUED | OUTPATIENT
Start: 2025-08-13 | End: 2025-08-13 | Stop reason: HOSPADM

## 2025-08-13 RX ORDER — HYDROMORPHONE HYDROCHLORIDE 1 MG/ML
0.2 INJECTION, SOLUTION INTRAMUSCULAR; INTRAVENOUS; SUBCUTANEOUS EVERY 5 MIN PRN
Status: DISCONTINUED | OUTPATIENT
Start: 2025-08-13 | End: 2025-08-13 | Stop reason: HOSPADM

## 2025-08-13 RX ADMIN — DEXAMETHASONE SODIUM PHOSPHATE 4 MG: 4 MG/ML VIAL (ML) INJECTION at 07:39:00

## 2025-08-13 RX ADMIN — ROCURONIUM BROMIDE 10 MG: 10 INJECTION, SOLUTION INTRAVENOUS at 07:52:00

## 2025-08-13 RX ADMIN — SODIUM CHLORIDE: 9 INJECTION, SOLUTION INTRAVENOUS at 09:36:00

## 2025-08-13 RX ADMIN — SODIUM CHLORIDE: 9 INJECTION, SOLUTION INTRAVENOUS at 07:22:00

## 2025-08-13 RX ADMIN — ROCURONIUM BROMIDE 5 MG: 10 INJECTION, SOLUTION INTRAVENOUS at 08:29:00

## 2025-08-13 RX ADMIN — GLYCOPYRROLATE 0.2 MG: 0.2 INJECTION, SOLUTION INTRAMUSCULAR; INTRAVENOUS at 07:51:00

## 2025-08-13 RX ADMIN — HEPARIN SODIUM 15000 UNITS: 1000 INJECTION, SOLUTION INTRAVENOUS; SUBCUTANEOUS at 07:48:00

## 2025-08-13 RX ADMIN — PROTAMINE SULFATE 80 MG: 10 INJECTION, SOLUTION INTRAVENOUS at 08:58:00

## 2025-08-13 RX ADMIN — ROCURONIUM BROMIDE 50 MG: 10 INJECTION, SOLUTION INTRAVENOUS at 07:22:00

## 2025-08-13 RX ADMIN — LIDOCAINE HYDROCHLORIDE 50 MG: 10 INJECTION, SOLUTION EPIDURAL; INFILTRATION; INTRACAUDAL; PERINEURAL at 07:22:00

## 2025-08-13 RX ADMIN — ONDANSETRON 4 MG: 2 INJECTION INTRAMUSCULAR; INTRAVENOUS at 08:58:00

## (undated) NOTE — LETTER
[facility] [address], [address]  Consent for Procedure/Sedation    Date: 8/30/2020    Time: 18:24      1. I authorize the performance upon Maranda Frederick the following:  Direct current cardioversion     2.  I authorize Dr. Sj Ragland (and 6/10/1940       Medical Record #: [id number]

## (undated) NOTE — LETTER
[facility] [address], [address]  Consent for Procedure/Sedation    Date: 08/31/20    Time: 2605      1. I authorize the performance upon Maranda Frederick the following:  Cardioversion     2.  I authorize Dr. Tammy Turner (and whomever is Printed: 2020   5:42 PM  Patient Name: Jayesh Yost        : 6/10/1940       Medical Record #: [id number]